# Patient Record
Sex: MALE | Race: WHITE | NOT HISPANIC OR LATINO | Employment: UNEMPLOYED | URBAN - METROPOLITAN AREA
[De-identification: names, ages, dates, MRNs, and addresses within clinical notes are randomized per-mention and may not be internally consistent; named-entity substitution may affect disease eponyms.]

---

## 2022-07-22 ENCOUNTER — HOSPITAL ENCOUNTER (INPATIENT)
Facility: HOSPITAL | Age: 47
LOS: 7 days | Discharge: LEFT BEFORE DISCHARGE | End: 2022-07-30
Attending: EMERGENCY MEDICINE | Admitting: INTERNAL MEDICINE

## 2022-07-22 ENCOUNTER — APPOINTMENT (OUTPATIENT)
Dept: CARDIOLOGY | Facility: HOSPITAL | Age: 47
End: 2022-07-22

## 2022-07-22 ENCOUNTER — APPOINTMENT (OUTPATIENT)
Dept: GENERAL RADIOLOGY | Facility: HOSPITAL | Age: 47
End: 2022-07-22

## 2022-07-22 DIAGNOSIS — R18.8 CIRRHOSIS OF LIVER WITH ASCITES, UNSPECIFIED HEPATIC CIRRHOSIS TYPE: ICD-10-CM

## 2022-07-22 DIAGNOSIS — R18.8 OTHER ASCITES: ICD-10-CM

## 2022-07-22 DIAGNOSIS — R06.00 DYSPNEA, UNSPECIFIED TYPE: ICD-10-CM

## 2022-07-22 DIAGNOSIS — R60.1 GENERALIZED EDEMA: Primary | ICD-10-CM

## 2022-07-22 DIAGNOSIS — K74.60 CIRRHOSIS OF LIVER WITH ASCITES, UNSPECIFIED HEPATIC CIRRHOSIS TYPE: ICD-10-CM

## 2022-07-22 PROBLEM — F19.10 SUBSTANCE ABUSE: Status: ACTIVE | Noted: 2022-07-22

## 2022-07-22 PROBLEM — R80.9 PROTEINURIA: Status: ACTIVE | Noted: 2022-07-22

## 2022-07-22 PROBLEM — Z72.0 TOBACCO ABUSE: Status: ACTIVE | Noted: 2022-07-22

## 2022-07-22 PROBLEM — E88.09 HYPOALBUMINEMIA: Status: ACTIVE | Noted: 2022-07-22

## 2022-07-22 LAB
ALBUMIN SERPL-MCNC: 1.4 G/DL (ref 3.5–5.2)
ALBUMIN/GLOB SERPL: 0.3 G/DL
ALP SERPL-CCNC: 155 U/L (ref 39–117)
ALT SERPL W P-5'-P-CCNC: 19 U/L (ref 1–41)
AMPHET+METHAMPHET UR QL: POSITIVE
ANION GAP SERPL CALCULATED.3IONS-SCNC: 7 MMOL/L (ref 5–15)
AORTIC DIMENSIONLESS INDEX: 0.7 (DI)
AST SERPL-CCNC: 35 U/L (ref 1–40)
BACTERIA UR QL AUTO: NORMAL /HPF
BARBITURATES UR QL SCN: NEGATIVE
BASOPHILS # BLD AUTO: 0.13 10*3/MM3 (ref 0–0.2)
BASOPHILS NFR BLD AUTO: 1 % (ref 0–1.5)
BENZODIAZ UR QL SCN: NEGATIVE
BH CV ECHO MEAS - ACS: 2.5 CM
BH CV ECHO MEAS - AO MAX PG: 4.8 MMHG
BH CV ECHO MEAS - AO MEAN PG: 2.32 MMHG
BH CV ECHO MEAS - AO ROOT DIAM: 4.2 CM
BH CV ECHO MEAS - AO V2 MAX: 109.8 CM/SEC
BH CV ECHO MEAS - AO V2 VTI: 16.6 CM
BH CV ECHO MEAS - AVA(I,D): 2.8 CM2
BH CV ECHO MEAS - CONTRAST EF 4CH: 44 CM2
BH CV ECHO MEAS - EDV(CUBED): 98.6 ML
BH CV ECHO MEAS - EDV(MOD-SP2): 143 ML
BH CV ECHO MEAS - EDV(MOD-SP4): 138 ML
BH CV ECHO MEAS - EF(MOD-BP): 44.4 %
BH CV ECHO MEAS - EF(MOD-SP2): 44.8 %
BH CV ECHO MEAS - EF(MOD-SP4): 44.9 %
BH CV ECHO MEAS - ESV(CUBED): 51.4 ML
BH CV ECHO MEAS - ESV(MOD-SP2): 79 ML
BH CV ECHO MEAS - ESV(MOD-SP4): 76 ML
BH CV ECHO MEAS - FS: 19.5 %
BH CV ECHO MEAS - IVS/LVPW: 1.57 CM
BH CV ECHO MEAS - IVSD: 1.19 CM
BH CV ECHO MEAS - LAT PEAK E' VEL: 10 CM/SEC
BH CV ECHO MEAS - LV MASS(C)D: 153.7 GRAMS
BH CV ECHO MEAS - LV MAX PG: 2.3 MMHG
BH CV ECHO MEAS - LV MEAN PG: 1.24 MMHG
BH CV ECHO MEAS - LV V1 MAX: 75.8 CM/SEC
BH CV ECHO MEAS - LV V1 VTI: 11.4 CM
BH CV ECHO MEAS - LVIDD: 4.6 CM
BH CV ECHO MEAS - LVIDS: 3.7 CM
BH CV ECHO MEAS - LVOT AREA: 4.1 CM2
BH CV ECHO MEAS - LVOT DIAM: 2.28 CM
BH CV ECHO MEAS - LVPWD: 0.75 CM
BH CV ECHO MEAS - MED PEAK E' VEL: 8.2 CM/SEC
BH CV ECHO MEAS - MV A DUR: 0.16 SEC
BH CV ECHO MEAS - MV A MAX VEL: 60.8 CM/SEC
BH CV ECHO MEAS - MV DEC SLOPE: 215.8 CM/SEC2
BH CV ECHO MEAS - MV DEC TIME: 0.2 MSEC
BH CV ECHO MEAS - MV E MAX VEL: 41.1 CM/SEC
BH CV ECHO MEAS - MV E/A: 0.68
BH CV ECHO MEAS - MV MAX PG: 2.28 MMHG
BH CV ECHO MEAS - MV MEAN PG: 0.94 MMHG
BH CV ECHO MEAS - MV P1/2T: 72.9 MSEC
BH CV ECHO MEAS - MV V2 VTI: 17.1 CM
BH CV ECHO MEAS - MVA(P1/2T): 3 CM2
BH CV ECHO MEAS - MVA(VTI): 2.7 CM2
BH CV ECHO MEAS - PULM A REVS DUR: 0.14 SEC
BH CV ECHO MEAS - PULM A REVS VEL: 24.3 CM/SEC
BH CV ECHO MEAS - PULM DIAS VEL: 41.3 CM/SEC
BH CV ECHO MEAS - PULM S/D: 1.32
BH CV ECHO MEAS - PULM SYS VEL: 54.6 CM/SEC
BH CV ECHO MEAS - RAP SYSTOLE: 3 MMHG
BH CV ECHO MEAS - RVSP: 29.7 MMHG
BH CV ECHO MEAS - SV(LVOT): 46.4 ML
BH CV ECHO MEAS - SV(MOD-SP2): 64 ML
BH CV ECHO MEAS - SV(MOD-SP4): 62 ML
BH CV ECHO MEAS - TAPSE (>1.6): 1.7 CM
BH CV ECHO MEAS - TR MAX PG: 26.7 MMHG
BH CV ECHO MEAS - TR MAX VEL: 258.4 CM/SEC
BH CV ECHO MEASUREMENTS AVERAGE E/E' RATIO: 4.52
BH CV VAS BP RIGHT ARM: NORMAL MMHG
BH CV XLRA - RV BASE: 3.1 CM
BH CV XLRA - RV LENGTH: 7.1 CM
BH CV XLRA - RV MID: 3 CM
BH CV XLRA - TDI S': 13.3 CM/SEC
BILIRUB SERPL-MCNC: <0.2 MG/DL (ref 0–1.2)
BILIRUB UR QL STRIP: NEGATIVE
BUN SERPL-MCNC: 37 MG/DL (ref 6–20)
BUN/CREAT SERPL: 33.3 (ref 7–25)
C3 SERPL-MCNC: 97 MG/DL (ref 82–167)
C4 SERPL-MCNC: 8 MG/DL (ref 14–44)
CALCIUM SPEC-SCNC: 7.5 MG/DL (ref 8.6–10.5)
CANNABINOIDS SERPL QL: POSITIVE
CHLORIDE SERPL-SCNC: 105 MMOL/L (ref 98–107)
CHLORIDE UR-SCNC: 51 MMOL/L
CLARITY UR: CLEAR
CO2 SERPL-SCNC: 19 MMOL/L (ref 22–29)
COCAINE UR QL: NEGATIVE
COLOR UR: YELLOW
CREAT SERPL-MCNC: 1.11 MG/DL (ref 0.76–1.27)
CREAT UR-MCNC: 82.5 MG/DL
DEPRECATED RDW RBC AUTO: 47.5 FL (ref 37–54)
EGFRCR SERPLBLD CKD-EPI 2021: 83.5 ML/MIN/1.73
EOSINOPHIL # BLD AUTO: 0.42 10*3/MM3 (ref 0–0.4)
EOSINOPHIL NFR BLD AUTO: 3.3 % (ref 0.3–6.2)
ERYTHROCYTE [DISTWIDTH] IN BLOOD BY AUTOMATED COUNT: 17.2 % (ref 12.3–15.4)
GLOBULIN UR ELPH-MCNC: 4.4 GM/DL
GLUCOSE SERPL-MCNC: 143 MG/DL (ref 65–99)
GLUCOSE UR STRIP-MCNC: NEGATIVE MG/DL
HBV CORE IGM SERPL QL IA: NORMAL
HBV SURFACE AG SERPL QL IA: NORMAL
HCT VFR BLD AUTO: 34.8 % (ref 37.5–51)
HCV AB SER DONR QL: REACTIVE
HGB BLD-MCNC: 12.4 G/DL (ref 13–17.7)
HGB UR QL STRIP.AUTO: ABNORMAL
HYALINE CASTS UR QL AUTO: NORMAL /LPF
IMM GRANULOCYTES # BLD AUTO: 0.18 10*3/MM3 (ref 0–0.05)
IMM GRANULOCYTES NFR BLD AUTO: 1.4 % (ref 0–0.5)
KETONES UR QL STRIP: NEGATIVE
LEFT ATRIUM VOLUME INDEX: 21.6 ML/M2
LEUKOCYTE ESTERASE UR QL STRIP.AUTO: NEGATIVE
LYMPHOCYTES # BLD AUTO: 2.27 10*3/MM3 (ref 0.7–3.1)
LYMPHOCYTES NFR BLD AUTO: 18.1 % (ref 19.6–45.3)
MAXIMAL PREDICTED HEART RATE: 175 BPM
MCH RBC QN AUTO: 28.3 PG (ref 26.6–33)
MCHC RBC AUTO-ENTMCNC: 35.6 G/DL (ref 31.5–35.7)
MCV RBC AUTO: 79.5 FL (ref 79–97)
METHADONE UR QL SCN: NEGATIVE
MONOCYTES # BLD AUTO: 1.13 10*3/MM3 (ref 0.1–0.9)
MONOCYTES NFR BLD AUTO: 9 % (ref 5–12)
NEUTROPHILS NFR BLD AUTO: 67.2 % (ref 42.7–76)
NEUTROPHILS NFR BLD AUTO: 8.44 10*3/MM3 (ref 1.7–7)
NITRITE UR QL STRIP: NEGATIVE
NRBC BLD AUTO-RTO: 0 /100 WBC (ref 0–0.2)
NT-PROBNP SERPL-MCNC: 231 PG/ML (ref 0–450)
OPIATES UR QL: NEGATIVE
OXYCODONE UR QL SCN: NEGATIVE
PH UR STRIP.AUTO: 5.5 [PH] (ref 5–8)
PLATELET # BLD AUTO: 198 10*3/MM3 (ref 140–450)
PMV BLD AUTO: 9.7 FL (ref 6–12)
POTASSIUM SERPL-SCNC: 4.4 MMOL/L (ref 3.5–5.2)
PROT ?TM UR-MCNC: 214.4 MG/DL
PROT SERPL-MCNC: 5.8 G/DL (ref 6–8.5)
PROT UR QL STRIP: ABNORMAL
PROT/CREAT UR: 2598.8 MG/G CREA (ref 0–200)
QT INTERVAL: 372 MS
RBC # BLD AUTO: 4.38 10*6/MM3 (ref 4.14–5.8)
RBC # UR STRIP: NORMAL /HPF
REF LAB TEST METHOD: NORMAL
SARS-COV-2 RNA RESP QL NAA+PROBE: NOT DETECTED
SINUS: 3.8 CM
SODIUM SERPL-SCNC: 131 MMOL/L (ref 136–145)
SODIUM UR-SCNC: <20 MMOL/L
SP GR UR STRIP: 1.02 (ref 1–1.03)
SQUAMOUS #/AREA URNS HPF: NORMAL /HPF
STJ: 3.6 CM
STRESS TARGET HR: 149 BPM
TROPONIN T SERPL-MCNC: <0.01 NG/ML (ref 0–0.03)
UROBILINOGEN UR QL STRIP: ABNORMAL
WBC # UR STRIP: NORMAL /HPF
WBC NRBC COR # BLD: 12.57 10*3/MM3 (ref 3.4–10.8)

## 2022-07-22 PROCEDURE — 82436 ASSAY OF URINE CHLORIDE: CPT | Performed by: INTERNAL MEDICINE

## 2022-07-22 PROCEDURE — 86658 ENTEROVIRUS ANTIBODY: CPT | Performed by: NURSE PRACTITIONER

## 2022-07-22 PROCEDURE — 25010000002 FUROSEMIDE PER 20 MG: Performed by: EMERGENCY MEDICINE

## 2022-07-22 PROCEDURE — 80307 DRUG TEST PRSMV CHEM ANLYZR: CPT | Performed by: NURSE PRACTITIONER

## 2022-07-22 PROCEDURE — 84156 ASSAY OF PROTEIN URINE: CPT | Performed by: INTERNAL MEDICINE

## 2022-07-22 PROCEDURE — 87341 HEP B SURFACE AG NEUTRLZJ IA: CPT | Performed by: INTERNAL MEDICINE

## 2022-07-22 PROCEDURE — G0378 HOSPITAL OBSERVATION PER HR: HCPCS

## 2022-07-22 PROCEDURE — 93010 ELECTROCARDIOGRAM REPORT: CPT | Performed by: INTERNAL MEDICINE

## 2022-07-22 PROCEDURE — 86037 ANCA TITER EACH ANTIBODY: CPT | Performed by: INTERNAL MEDICINE

## 2022-07-22 PROCEDURE — 83516 IMMUNOASSAY NONANTIBODY: CPT | Performed by: INTERNAL MEDICINE

## 2022-07-22 PROCEDURE — 81001 URINALYSIS AUTO W/SCOPE: CPT | Performed by: EMERGENCY MEDICINE

## 2022-07-22 PROCEDURE — 93005 ELECTROCARDIOGRAM TRACING: CPT | Performed by: EMERGENCY MEDICINE

## 2022-07-22 PROCEDURE — 83521 IG LIGHT CHAINS FREE EACH: CPT | Performed by: INTERNAL MEDICINE

## 2022-07-22 PROCEDURE — 25010000002 PERFLUTREN (DEFINITY) 8.476 MG IN SODIUM CHLORIDE (PF) 0.9 % 10 ML INJECTION: Performed by: NURSE PRACTITIONER

## 2022-07-22 PROCEDURE — 86334 IMMUNOFIX E-PHORESIS SERUM: CPT | Performed by: INTERNAL MEDICINE

## 2022-07-22 PROCEDURE — 93306 TTE W/DOPPLER COMPLETE: CPT

## 2022-07-22 PROCEDURE — 82570 ASSAY OF URINE CREATININE: CPT | Performed by: INTERNAL MEDICINE

## 2022-07-22 PROCEDURE — 80074 ACUTE HEPATITIS PANEL: CPT | Performed by: INTERNAL MEDICINE

## 2022-07-22 PROCEDURE — 93306 TTE W/DOPPLER COMPLETE: CPT | Performed by: INTERNAL MEDICINE

## 2022-07-22 PROCEDURE — 93005 ELECTROCARDIOGRAM TRACING: CPT

## 2022-07-22 PROCEDURE — 86160 COMPLEMENT ANTIGEN: CPT | Performed by: INTERNAL MEDICINE

## 2022-07-22 PROCEDURE — 84300 ASSAY OF URINE SODIUM: CPT | Performed by: INTERNAL MEDICINE

## 2022-07-22 PROCEDURE — 85025 COMPLETE CBC W/AUTO DIFF WBC: CPT | Performed by: EMERGENCY MEDICINE

## 2022-07-22 PROCEDURE — 25010000002 ONDANSETRON PER 1 MG: Performed by: INTERNAL MEDICINE

## 2022-07-22 PROCEDURE — 84484 ASSAY OF TROPONIN QUANT: CPT | Performed by: EMERGENCY MEDICINE

## 2022-07-22 PROCEDURE — 90791 PSYCH DIAGNOSTIC EVALUATION: CPT

## 2022-07-22 PROCEDURE — 83880 ASSAY OF NATRIURETIC PEPTIDE: CPT | Performed by: EMERGENCY MEDICINE

## 2022-07-22 PROCEDURE — 80053 COMPREHEN METABOLIC PANEL: CPT | Performed by: EMERGENCY MEDICINE

## 2022-07-22 PROCEDURE — 71045 X-RAY EXAM CHEST 1 VIEW: CPT

## 2022-07-22 PROCEDURE — 82784 ASSAY IGA/IGD/IGG/IGM EACH: CPT | Performed by: INTERNAL MEDICINE

## 2022-07-22 PROCEDURE — U0003 INFECTIOUS AGENT DETECTION BY NUCLEIC ACID (DNA OR RNA); SEVERE ACUTE RESPIRATORY SYNDROME CORONAVIRUS 2 (SARS-COV-2) (CORONAVIRUS DISEASE [COVID-19]), AMPLIFIED PROBE TECHNIQUE, MAKING USE OF HIGH THROUGHPUT TECHNOLOGIES AS DESCRIBED BY CMS-2020-01-R: HCPCS | Performed by: EMERGENCY MEDICINE

## 2022-07-22 PROCEDURE — 82595 ASSAY OF CRYOGLOBULIN: CPT | Performed by: INTERNAL MEDICINE

## 2022-07-22 PROCEDURE — 99284 EMERGENCY DEPT VISIT MOD MDM: CPT

## 2022-07-22 RX ORDER — OXYCODONE HYDROCHLORIDE AND ACETAMINOPHEN 5; 325 MG/1; MG/1
1 TABLET ORAL ONCE
Status: COMPLETED | OUTPATIENT
Start: 2022-07-22 | End: 2022-07-22

## 2022-07-22 RX ORDER — SODIUM CHLORIDE 0.9 % (FLUSH) 0.9 %
10 SYRINGE (ML) INJECTION EVERY 12 HOURS SCHEDULED
Status: DISCONTINUED | OUTPATIENT
Start: 2022-07-22 | End: 2022-07-30 | Stop reason: HOSPADM

## 2022-07-22 RX ORDER — NICOTINE 21 MG/24HR
1 PATCH, TRANSDERMAL 24 HOURS TRANSDERMAL
Status: DISCONTINUED | OUTPATIENT
Start: 2022-07-22 | End: 2022-07-30 | Stop reason: HOSPADM

## 2022-07-22 RX ORDER — HYDROXYZINE HYDROCHLORIDE 25 MG/1
50 TABLET, FILM COATED ORAL 3 TIMES DAILY PRN
Status: DISPENSED | OUTPATIENT
Start: 2022-07-22 | End: 2022-07-27

## 2022-07-22 RX ORDER — CLONIDINE HYDROCHLORIDE 0.1 MG/1
0.1 TABLET ORAL ONCE AS NEEDED
Status: DISCONTINUED | OUTPATIENT
Start: 2022-07-26 | End: 2022-07-27

## 2022-07-22 RX ORDER — CLONIDINE HYDROCHLORIDE 0.1 MG/1
0.1 TABLET ORAL 2 TIMES DAILY PRN
Status: DISCONTINUED | OUTPATIENT
Start: 2022-07-25 | End: 2022-07-26

## 2022-07-22 RX ORDER — CLONIDINE HYDROCHLORIDE 0.1 MG/1
0.1 TABLET ORAL 4 TIMES DAILY PRN
Status: DISCONTINUED | OUTPATIENT
Start: 2022-07-23 | End: 2022-07-24

## 2022-07-22 RX ORDER — SODIUM CHLORIDE 0.9 % (FLUSH) 0.9 %
10 SYRINGE (ML) INJECTION AS NEEDED
Status: DISCONTINUED | OUTPATIENT
Start: 2022-07-22 | End: 2022-07-30 | Stop reason: HOSPADM

## 2022-07-22 RX ORDER — ACETAMINOPHEN 160 MG/5ML
650 SOLUTION ORAL EVERY 4 HOURS PRN
Status: DISCONTINUED | OUTPATIENT
Start: 2022-07-22 | End: 2022-07-30 | Stop reason: HOSPADM

## 2022-07-22 RX ORDER — NITROGLYCERIN 0.4 MG/1
0.4 TABLET SUBLINGUAL
Status: DISCONTINUED | OUTPATIENT
Start: 2022-07-22 | End: 2022-07-30 | Stop reason: HOSPADM

## 2022-07-22 RX ORDER — FUROSEMIDE 10 MG/ML
80 INJECTION INTRAMUSCULAR; INTRAVENOUS ONCE
Status: COMPLETED | OUTPATIENT
Start: 2022-07-22 | End: 2022-07-22

## 2022-07-22 RX ORDER — ACETAMINOPHEN 325 MG/1
650 TABLET ORAL EVERY 4 HOURS PRN
Status: DISCONTINUED | OUTPATIENT
Start: 2022-07-22 | End: 2022-07-30 | Stop reason: HOSPADM

## 2022-07-22 RX ORDER — ACETAMINOPHEN 650 MG/1
650 SUPPOSITORY RECTAL EVERY 4 HOURS PRN
Status: DISCONTINUED | OUTPATIENT
Start: 2022-07-22 | End: 2022-07-30 | Stop reason: HOSPADM

## 2022-07-22 RX ORDER — ONDANSETRON 2 MG/ML
4 INJECTION INTRAMUSCULAR; INTRAVENOUS EVERY 6 HOURS PRN
Status: DISCONTINUED | OUTPATIENT
Start: 2022-07-22 | End: 2022-07-22

## 2022-07-22 RX ORDER — CLONIDINE HYDROCHLORIDE 0.1 MG/1
0.1 TABLET ORAL 3 TIMES DAILY PRN
Status: DISCONTINUED | OUTPATIENT
Start: 2022-07-24 | End: 2022-07-25

## 2022-07-22 RX ORDER — CLONIDINE HYDROCHLORIDE 0.1 MG/1
0.1 TABLET ORAL 4 TIMES DAILY PRN
Status: DISCONTINUED | OUTPATIENT
Start: 2022-07-22 | End: 2022-07-23

## 2022-07-22 RX ORDER — ONDANSETRON 2 MG/ML
4 INJECTION INTRAMUSCULAR; INTRAVENOUS EVERY 6 HOURS PRN
Status: DISCONTINUED | OUTPATIENT
Start: 2022-07-22 | End: 2022-07-30 | Stop reason: HOSPADM

## 2022-07-22 RX ORDER — ALUMINA, MAGNESIA, AND SIMETHICONE 2400; 2400; 240 MG/30ML; MG/30ML; MG/30ML
15 SUSPENSION ORAL EVERY 6 HOURS PRN
Status: DISCONTINUED | OUTPATIENT
Start: 2022-07-22 | End: 2022-07-30 | Stop reason: HOSPADM

## 2022-07-22 RX ADMIN — HYDROXYZINE HYDROCHLORIDE 50 MG: 25 TABLET ORAL at 23:40

## 2022-07-22 RX ADMIN — ONDANSETRON 4 MG: 2 INJECTION INTRAMUSCULAR; INTRAVENOUS at 08:35

## 2022-07-22 RX ADMIN — OXYCODONE AND ACETAMINOPHEN 1 TABLET: 5; 325 TABLET ORAL at 07:02

## 2022-07-22 RX ADMIN — PERFLUTREN 2 ML: 6.52 INJECTION, SUSPENSION INTRAVENOUS at 16:00

## 2022-07-22 RX ADMIN — ALUMINA, MAGNESIA, AND SIMETHICONE 15 ML: 2400; 2400; 240 SUSPENSION ORAL at 14:10

## 2022-07-22 RX ADMIN — NICOTINE 1 PATCH: 21 PATCH, EXTENDED RELEASE TRANSDERMAL at 14:14

## 2022-07-22 RX ADMIN — ACETAMINOPHEN 650 MG: 325 TABLET, FILM COATED ORAL at 18:30

## 2022-07-22 RX ADMIN — ACETAMINOPHEN 650 MG: 325 TABLET, FILM COATED ORAL at 23:40

## 2022-07-22 RX ADMIN — ALUMINA, MAGNESIA, AND SIMETHICONE 15 ML: 2400; 2400; 240 SUSPENSION ORAL at 08:35

## 2022-07-22 RX ADMIN — HYDROXYZINE HYDROCHLORIDE 50 MG: 25 TABLET ORAL at 14:38

## 2022-07-22 RX ADMIN — FUROSEMIDE 80 MG: 10 INJECTION, SOLUTION INTRAMUSCULAR; INTRAVENOUS at 07:02

## 2022-07-23 ENCOUNTER — APPOINTMENT (OUTPATIENT)
Dept: ULTRASOUND IMAGING | Facility: HOSPITAL | Age: 47
End: 2022-07-23

## 2022-07-23 ENCOUNTER — APPOINTMENT (OUTPATIENT)
Dept: CARDIOLOGY | Facility: HOSPITAL | Age: 47
End: 2022-07-23

## 2022-07-23 PROBLEM — R60.1 GENERALIZED EDEMA: Status: ACTIVE | Noted: 2022-07-23

## 2022-07-23 LAB
ALBUMIN SERPL-MCNC: 1.7 G/DL (ref 3.5–5.2)
ALBUMIN/GLOB SERPL: 0.5 G/DL
ALP SERPL-CCNC: 137 U/L (ref 39–117)
ALT SERPL W P-5'-P-CCNC: 16 U/L (ref 1–41)
ANION GAP SERPL CALCULATED.3IONS-SCNC: 7 MMOL/L (ref 5–15)
AST SERPL-CCNC: 33 U/L (ref 1–40)
BASOPHILS # BLD AUTO: 0.12 10*3/MM3 (ref 0–0.2)
BASOPHILS NFR BLD AUTO: 1.3 % (ref 0–1.5)
BH CV LOWER VASCULAR LEFT COMMON FEMORAL AUGMENT: NORMAL
BH CV LOWER VASCULAR LEFT COMMON FEMORAL COMPETENT: NORMAL
BH CV LOWER VASCULAR LEFT COMMON FEMORAL COMPRESS: NORMAL
BH CV LOWER VASCULAR LEFT COMMON FEMORAL PHASIC: NORMAL
BH CV LOWER VASCULAR LEFT COMMON FEMORAL SPONT: NORMAL
BH CV LOWER VASCULAR LEFT DISTAL FEMORAL COMPRESS: NORMAL
BH CV LOWER VASCULAR LEFT GASTRONEMIUS COMPRESS: NORMAL
BH CV LOWER VASCULAR LEFT GREATER SAPH AK COMPRESS: NORMAL
BH CV LOWER VASCULAR LEFT GREATER SAPH BK COMPRESS: NORMAL
BH CV LOWER VASCULAR LEFT LESSER SAPH COMPRESS: NORMAL
BH CV LOWER VASCULAR LEFT MID FEMORAL AUGMENT: NORMAL
BH CV LOWER VASCULAR LEFT MID FEMORAL COMPETENT: NORMAL
BH CV LOWER VASCULAR LEFT MID FEMORAL COMPRESS: NORMAL
BH CV LOWER VASCULAR LEFT MID FEMORAL PHASIC: NORMAL
BH CV LOWER VASCULAR LEFT MID FEMORAL SPONT: NORMAL
BH CV LOWER VASCULAR LEFT PERONEAL COMPRESS: NORMAL
BH CV LOWER VASCULAR LEFT POPLITEAL AUGMENT: NORMAL
BH CV LOWER VASCULAR LEFT POPLITEAL COMPETENT: NORMAL
BH CV LOWER VASCULAR LEFT POPLITEAL COMPRESS: NORMAL
BH CV LOWER VASCULAR LEFT POPLITEAL PHASIC: NORMAL
BH CV LOWER VASCULAR LEFT POPLITEAL SPONT: NORMAL
BH CV LOWER VASCULAR LEFT POSTERIOR TIBIAL COMPRESS: NORMAL
BH CV LOWER VASCULAR LEFT PROFUNDA FEMORAL COMPRESS: NORMAL
BH CV LOWER VASCULAR LEFT PROXIMAL FEMORAL COMPRESS: NORMAL
BH CV LOWER VASCULAR LEFT SAPHENOFEMORAL JUNCTION COMPRESS: NORMAL
BH CV LOWER VASCULAR RIGHT COMMON FEMORAL AUGMENT: NORMAL
BH CV LOWER VASCULAR RIGHT COMMON FEMORAL COMPETENT: NORMAL
BH CV LOWER VASCULAR RIGHT COMMON FEMORAL COMPRESS: NORMAL
BH CV LOWER VASCULAR RIGHT COMMON FEMORAL PHASIC: NORMAL
BH CV LOWER VASCULAR RIGHT COMMON FEMORAL SPONT: NORMAL
BH CV LOWER VASCULAR RIGHT DISTAL FEMORAL COMPRESS: NORMAL
BH CV LOWER VASCULAR RIGHT GASTRONEMIUS COMPRESS: NORMAL
BH CV LOWER VASCULAR RIGHT GREATER SAPH AK COMPRESS: NORMAL
BH CV LOWER VASCULAR RIGHT GREATER SAPH BK COMPRESS: NORMAL
BH CV LOWER VASCULAR RIGHT LESSER SAPH COMPRESS: NORMAL
BH CV LOWER VASCULAR RIGHT MID FEMORAL AUGMENT: NORMAL
BH CV LOWER VASCULAR RIGHT MID FEMORAL COMPETENT: NORMAL
BH CV LOWER VASCULAR RIGHT MID FEMORAL COMPRESS: NORMAL
BH CV LOWER VASCULAR RIGHT MID FEMORAL PHASIC: NORMAL
BH CV LOWER VASCULAR RIGHT MID FEMORAL SPONT: NORMAL
BH CV LOWER VASCULAR RIGHT PERONEAL COMPRESS: NORMAL
BH CV LOWER VASCULAR RIGHT POPLITEAL AUGMENT: NORMAL
BH CV LOWER VASCULAR RIGHT POPLITEAL COMPETENT: NORMAL
BH CV LOWER VASCULAR RIGHT POPLITEAL COMPRESS: NORMAL
BH CV LOWER VASCULAR RIGHT POPLITEAL PHASIC: NORMAL
BH CV LOWER VASCULAR RIGHT POPLITEAL SPONT: NORMAL
BH CV LOWER VASCULAR RIGHT POSTERIOR TIBIAL COMPRESS: NORMAL
BH CV LOWER VASCULAR RIGHT PROFUNDA FEMORAL COMPRESS: NORMAL
BH CV LOWER VASCULAR RIGHT PROXIMAL FEMORAL COMPRESS: NORMAL
BH CV LOWER VASCULAR RIGHT SAPHENOFEMORAL JUNCTION COMPRESS: NORMAL
BILIRUB SERPL-MCNC: <0.2 MG/DL (ref 0–1.2)
BUN SERPL-MCNC: 35 MG/DL (ref 6–20)
BUN/CREAT SERPL: 31.5 (ref 7–25)
CALCIUM SPEC-SCNC: 7.6 MG/DL (ref 8.6–10.5)
CHLORIDE SERPL-SCNC: 103 MMOL/L (ref 98–107)
CO2 SERPL-SCNC: 20 MMOL/L (ref 22–29)
CREAT SERPL-MCNC: 1.11 MG/DL (ref 0.76–1.27)
CREAT UR-MCNC: 41.8 MG/DL
DEPRECATED RDW RBC AUTO: 51.4 FL (ref 37–54)
EGFRCR SERPLBLD CKD-EPI 2021: 83.5 ML/MIN/1.73
EOSINOPHIL # BLD AUTO: 0.48 10*3/MM3 (ref 0–0.4)
EOSINOPHIL NFR BLD AUTO: 5 % (ref 0.3–6.2)
ERYTHROCYTE [DISTWIDTH] IN BLOOD BY AUTOMATED COUNT: 17.3 % (ref 12.3–15.4)
GLOBULIN UR ELPH-MCNC: 3.6 GM/DL
GLUCOSE SERPL-MCNC: 146 MG/DL (ref 65–99)
HBV SURFACE AG SERPL QL IA: NORMAL
HBV SURFACE AG SERPL QL NT: POSITIVE
HCT VFR BLD AUTO: 34.8 % (ref 37.5–51)
HGB BLD-MCNC: 11.6 G/DL (ref 13–17.7)
HIV1+2 AB SER QL: NORMAL
IMM GRANULOCYTES # BLD AUTO: 0.1 10*3/MM3 (ref 0–0.05)
IMM GRANULOCYTES NFR BLD AUTO: 1 % (ref 0–0.5)
LYMPHOCYTES # BLD AUTO: 1.89 10*3/MM3 (ref 0.7–3.1)
LYMPHOCYTES NFR BLD AUTO: 19.7 % (ref 19.6–45.3)
MAGNESIUM SERPL-MCNC: 1.8 MG/DL (ref 1.6–2.6)
MAXIMAL PREDICTED HEART RATE: 175 BPM
MCH RBC QN AUTO: 27.6 PG (ref 26.6–33)
MCHC RBC AUTO-ENTMCNC: 33.3 G/DL (ref 31.5–35.7)
MCV RBC AUTO: 82.7 FL (ref 79–97)
MONOCYTES # BLD AUTO: 0.84 10*3/MM3 (ref 0.1–0.9)
MONOCYTES NFR BLD AUTO: 8.8 % (ref 5–12)
NEUTROPHILS NFR BLD AUTO: 6.16 10*3/MM3 (ref 1.7–7)
NEUTROPHILS NFR BLD AUTO: 64.2 % (ref 42.7–76)
NRBC BLD AUTO-RTO: 0 /100 WBC (ref 0–0.2)
PHOSPHATE SERPL-MCNC: 3.9 MG/DL (ref 2.5–4.5)
PLATELET # BLD AUTO: 181 10*3/MM3 (ref 140–450)
PMV BLD AUTO: 9.8 FL (ref 6–12)
POTASSIUM SERPL-SCNC: 3.9 MMOL/L (ref 3.5–5.2)
PROT ?TM UR-MCNC: 150.3 MG/DL
PROT SERPL-MCNC: 5.3 G/DL (ref 6–8.5)
PROT/CREAT UR: 3595.7 MG/G CREA (ref 0–200)
RBC # BLD AUTO: 4.21 10*6/MM3 (ref 4.14–5.8)
SODIUM SERPL-SCNC: 130 MMOL/L (ref 136–145)
STRESS TARGET HR: 149 BPM
URATE SERPL-MCNC: 6.9 MG/DL (ref 3.4–7)
WBC NRBC COR # BLD: 9.59 10*3/MM3 (ref 3.4–10.8)

## 2022-07-23 PROCEDURE — 25010000002 ALBUMIN HUMAN 25% PER 50 ML: Performed by: INTERNAL MEDICINE

## 2022-07-23 PROCEDURE — P9047 ALBUMIN (HUMAN), 25%, 50ML: HCPCS | Performed by: INTERNAL MEDICINE

## 2022-07-23 PROCEDURE — 93970 EXTREMITY STUDY: CPT

## 2022-07-23 PROCEDURE — G0432 EIA HIV-1/HIV-2 SCREEN: HCPCS | Performed by: INTERNAL MEDICINE

## 2022-07-23 PROCEDURE — 76700 US EXAM ABDOM COMPLETE: CPT

## 2022-07-23 PROCEDURE — 84100 ASSAY OF PHOSPHORUS: CPT | Performed by: INTERNAL MEDICINE

## 2022-07-23 PROCEDURE — 85025 COMPLETE CBC W/AUTO DIFF WBC: CPT | Performed by: INTERNAL MEDICINE

## 2022-07-23 PROCEDURE — 83735 ASSAY OF MAGNESIUM: CPT | Performed by: INTERNAL MEDICINE

## 2022-07-23 PROCEDURE — 25010000002 FUROSEMIDE PER 20 MG: Performed by: INTERNAL MEDICINE

## 2022-07-23 PROCEDURE — 84550 ASSAY OF BLOOD/URIC ACID: CPT | Performed by: INTERNAL MEDICINE

## 2022-07-23 PROCEDURE — 36415 COLL VENOUS BLD VENIPUNCTURE: CPT | Performed by: NURSE PRACTITIONER

## 2022-07-23 PROCEDURE — 80053 COMPREHEN METABOLIC PANEL: CPT | Performed by: NURSE PRACTITIONER

## 2022-07-23 PROCEDURE — 87517 HEPATITIS B DNA QUANT: CPT | Performed by: INTERNAL MEDICINE

## 2022-07-23 PROCEDURE — 82570 ASSAY OF URINE CREATININE: CPT | Performed by: INTERNAL MEDICINE

## 2022-07-23 PROCEDURE — 84156 ASSAY OF PROTEIN URINE: CPT | Performed by: INTERNAL MEDICINE

## 2022-07-23 RX ORDER — FUROSEMIDE 10 MG/ML
80 INJECTION INTRAMUSCULAR; INTRAVENOUS EVERY 12 HOURS
Status: DISCONTINUED | OUTPATIENT
Start: 2022-07-23 | End: 2022-07-26

## 2022-07-23 RX ORDER — OXYCODONE HYDROCHLORIDE 5 MG/1
5 TABLET ORAL EVERY 6 HOURS PRN
Status: DISCONTINUED | OUTPATIENT
Start: 2022-07-23 | End: 2022-07-26

## 2022-07-23 RX ORDER — ALBUMIN (HUMAN) 12.5 G/50ML
12.5 SOLUTION INTRAVENOUS EVERY 12 HOURS
Status: COMPLETED | OUTPATIENT
Start: 2022-07-23 | End: 2022-07-25

## 2022-07-23 RX ADMIN — OXYCODONE 5 MG: 5 TABLET ORAL at 22:55

## 2022-07-23 RX ADMIN — NICOTINE 1 PATCH: 21 PATCH, EXTENDED RELEASE TRANSDERMAL at 10:32

## 2022-07-23 RX ADMIN — OXYCODONE 5 MG: 5 TABLET ORAL at 17:01

## 2022-07-23 RX ADMIN — ALBUMIN HUMAN 12.5 G: 0.25 SOLUTION INTRAVENOUS at 17:02

## 2022-07-23 RX ADMIN — Medication 10 ML: at 22:55

## 2022-07-23 RX ADMIN — ACETAMINOPHEN 650 MG: 325 TABLET, FILM COATED ORAL at 09:01

## 2022-07-23 RX ADMIN — FUROSEMIDE 80 MG: 10 INJECTION, SOLUTION INTRAMUSCULAR; INTRAVENOUS at 12:24

## 2022-07-24 ENCOUNTER — HOSPITAL ENCOUNTER (OUTPATIENT)
Facility: HOSPITAL | Age: 47
Setting detail: HOSPITAL OUTPATIENT SURGERY
End: 2022-07-24
Attending: INTERNAL MEDICINE | Admitting: INTERNAL MEDICINE

## 2022-07-24 PROBLEM — R18.8 CIRRHOSIS OF LIVER WITH ASCITES: Status: ACTIVE | Noted: 2022-07-24

## 2022-07-24 PROBLEM — K74.60 CIRRHOSIS OF LIVER WITH ASCITES: Status: ACTIVE | Noted: 2022-07-24

## 2022-07-24 LAB
ALBUMIN SERPL-MCNC: 1.7 G/DL (ref 3.5–5.2)
ANION GAP SERPL CALCULATED.3IONS-SCNC: 7.4 MMOL/L (ref 5–15)
BUN SERPL-MCNC: 29 MG/DL (ref 6–20)
BUN/CREAT SERPL: 27.9 (ref 7–25)
CALCIUM SPEC-SCNC: 7.7 MG/DL (ref 8.6–10.5)
CHLORIDE SERPL-SCNC: 100 MMOL/L (ref 98–107)
CO2 SERPL-SCNC: 21.6 MMOL/L (ref 22–29)
CREAT SERPL-MCNC: 1.04 MG/DL (ref 0.76–1.27)
EGFRCR SERPLBLD CKD-EPI 2021: 90.2 ML/MIN/1.73
GLUCOSE SERPL-MCNC: 129 MG/DL (ref 65–99)
PHOSPHATE SERPL-MCNC: 3.4 MG/DL (ref 2.5–4.5)
POTASSIUM SERPL-SCNC: 4.2 MMOL/L (ref 3.5–5.2)
SODIUM SERPL-SCNC: 129 MMOL/L (ref 136–145)

## 2022-07-24 PROCEDURE — 25010000002 ALBUMIN HUMAN 25% PER 50 ML: Performed by: INTERNAL MEDICINE

## 2022-07-24 PROCEDURE — 99253 IP/OBS CNSLTJ NEW/EST LOW 45: CPT | Performed by: INTERNAL MEDICINE

## 2022-07-24 PROCEDURE — 25010000002 FUROSEMIDE PER 20 MG: Performed by: INTERNAL MEDICINE

## 2022-07-24 PROCEDURE — 99254 IP/OBS CNSLTJ NEW/EST MOD 60: CPT | Performed by: INTERNAL MEDICINE

## 2022-07-24 PROCEDURE — 80069 RENAL FUNCTION PANEL: CPT | Performed by: INTERNAL MEDICINE

## 2022-07-24 PROCEDURE — P9047 ALBUMIN (HUMAN), 25%, 50ML: HCPCS | Performed by: INTERNAL MEDICINE

## 2022-07-24 RX ADMIN — HYDROXYZINE HYDROCHLORIDE 50 MG: 25 TABLET ORAL at 04:44

## 2022-07-24 RX ADMIN — FUROSEMIDE 80 MG: 10 INJECTION, SOLUTION INTRAMUSCULAR; INTRAVENOUS at 04:59

## 2022-07-24 RX ADMIN — ALBUMIN HUMAN 12.5 G: 0.25 SOLUTION INTRAVENOUS at 16:22

## 2022-07-24 RX ADMIN — OXYCODONE 5 MG: 5 TABLET ORAL at 17:00

## 2022-07-24 RX ADMIN — OXYCODONE 5 MG: 5 TABLET ORAL at 04:44

## 2022-07-24 RX ADMIN — OXYCODONE 5 MG: 5 TABLET ORAL at 10:48

## 2022-07-24 RX ADMIN — ALBUMIN HUMAN 12.5 G: 0.25 SOLUTION INTRAVENOUS at 04:40

## 2022-07-24 RX ADMIN — NICOTINE 1 PATCH: 21 PATCH, EXTENDED RELEASE TRANSDERMAL at 08:25

## 2022-07-24 RX ADMIN — FUROSEMIDE 80 MG: 10 INJECTION, SOLUTION INTRAMUSCULAR; INTRAVENOUS at 16:42

## 2022-07-24 RX ADMIN — HYDROXYZINE HYDROCHLORIDE 50 MG: 25 TABLET ORAL at 17:00

## 2022-07-24 RX ADMIN — Medication 10 ML: at 08:26

## 2022-07-24 RX ADMIN — Medication 10 ML: at 20:36

## 2022-07-25 ENCOUNTER — ANESTHESIA EVENT (OUTPATIENT)
Dept: GASTROENTEROLOGY | Facility: HOSPITAL | Age: 47
End: 2022-07-25

## 2022-07-25 ENCOUNTER — ANESTHESIA (OUTPATIENT)
Dept: GASTROENTEROLOGY | Facility: HOSPITAL | Age: 47
End: 2022-07-25

## 2022-07-25 PROBLEM — I50.21 ACUTE SYSTOLIC HEART FAILURE: Status: ACTIVE | Noted: 2022-07-25

## 2022-07-25 PROBLEM — I51.9 LV DYSFUNCTION: Status: ACTIVE | Noted: 2022-07-25

## 2022-07-25 LAB
ALBUMIN SERPL-MCNC: 2.1 G/DL (ref 3.5–5.2)
ALBUMIN/GLOB SERPL: 0.6 G/DL
ALP SERPL-CCNC: 126 U/L (ref 39–117)
ALPHA-FETOPROTEIN: <2 NG/ML (ref 0–8.3)
ALT SERPL W P-5'-P-CCNC: 14 U/L (ref 1–41)
ANION GAP SERPL CALCULATED.3IONS-SCNC: 6.3 MMOL/L (ref 5–15)
AST SERPL-CCNC: 33 U/L (ref 1–40)
BASOPHILS # BLD AUTO: 0.06 10*3/MM3 (ref 0–0.2)
BASOPHILS NFR BLD AUTO: 0.9 % (ref 0–1.5)
BILIRUB SERPL-MCNC: 0.2 MG/DL (ref 0–1.2)
BUN SERPL-MCNC: 25 MG/DL (ref 6–20)
BUN/CREAT SERPL: 25.5 (ref 7–25)
CALCIUM SPEC-SCNC: 7.9 MG/DL (ref 8.6–10.5)
CHLORIDE SERPL-SCNC: 101 MMOL/L (ref 98–107)
CO2 SERPL-SCNC: 22.7 MMOL/L (ref 22–29)
CREAT SERPL-MCNC: 0.98 MG/DL (ref 0.76–1.27)
DEPRECATED RDW RBC AUTO: 50.5 FL (ref 37–54)
EGFRCR SERPLBLD CKD-EPI 2021: 96.9 ML/MIN/1.73
EOSINOPHIL # BLD AUTO: 0.28 10*3/MM3 (ref 0–0.4)
EOSINOPHIL NFR BLD AUTO: 4.2 % (ref 0.3–6.2)
ERYTHROCYTE [DISTWIDTH] IN BLOOD BY AUTOMATED COUNT: 17.4 % (ref 12.3–15.4)
GLOBULIN UR ELPH-MCNC: 3.4 GM/DL
GLUCOSE SERPL-MCNC: 115 MG/DL (ref 65–99)
HCT VFR BLD AUTO: 30.8 % (ref 37.5–51)
HGB BLD-MCNC: 10.5 G/DL (ref 13–17.7)
IGA SERPL-MCNC: 401 MG/DL (ref 90–386)
IGG SERPL-MCNC: 1575 MG/DL (ref 603–1613)
IGM SERPL-MCNC: 387 MG/DL (ref 20–172)
IMM GRANULOCYTES # BLD AUTO: 0.06 10*3/MM3 (ref 0–0.05)
IMM GRANULOCYTES NFR BLD AUTO: 0.9 % (ref 0–0.5)
INR PPP: 1.13 (ref 0.9–1.1)
KAPPA LC FREE SER-MCNC: 169.1 MG/L (ref 3.3–19.4)
KAPPA LC FREE/LAMBDA FREE SER: 1.09 {RATIO} (ref 0.26–1.65)
LAMBDA LC FREE SERPL-MCNC: 155.4 MG/L (ref 5.7–26.3)
LYMPHOCYTES # BLD AUTO: 1.09 10*3/MM3 (ref 0.7–3.1)
LYMPHOCYTES NFR BLD AUTO: 16.3 % (ref 19.6–45.3)
MCH RBC QN AUTO: 28 PG (ref 26.6–33)
MCHC RBC AUTO-ENTMCNC: 34.1 G/DL (ref 31.5–35.7)
MCV RBC AUTO: 82.1 FL (ref 79–97)
MONOCYTES # BLD AUTO: 0.69 10*3/MM3 (ref 0.1–0.9)
MONOCYTES NFR BLD AUTO: 10.3 % (ref 5–12)
NEUTROPHILS NFR BLD AUTO: 4.51 10*3/MM3 (ref 1.7–7)
NEUTROPHILS NFR BLD AUTO: 67.4 % (ref 42.7–76)
NRBC BLD AUTO-RTO: 0 /100 WBC (ref 0–0.2)
PHOSPHATE SERPL-MCNC: 3.1 MG/DL (ref 2.5–4.5)
PLA2R IGG SER IA-ACNC: 1.9 RU/ML (ref 0–19.9)
PLATELET # BLD AUTO: 124 10*3/MM3 (ref 140–450)
PMV BLD AUTO: 9.6 FL (ref 6–12)
POTASSIUM SERPL-SCNC: 4 MMOL/L (ref 3.5–5.2)
PROT PATTERN SERPL IFE-IMP: ABNORMAL
PROT SERPL-MCNC: 5.5 G/DL (ref 6–8.5)
PROTHROMBIN TIME: 14.3 SECONDS (ref 11.7–14.2)
RBC # BLD AUTO: 3.75 10*6/MM3 (ref 4.14–5.8)
SODIUM SERPL-SCNC: 130 MMOL/L (ref 136–145)
WBC NRBC COR # BLD: 6.69 10*3/MM3 (ref 3.4–10.8)

## 2022-07-25 PROCEDURE — 0F9G8ZZ DRAINAGE OF PANCREAS, VIA NATURAL OR ARTIFICIAL OPENING ENDOSCOPIC: ICD-10-PCS | Performed by: INTERNAL MEDICINE

## 2022-07-25 PROCEDURE — 82105 ALPHA-FETOPROTEIN SERUM: CPT | Performed by: INTERNAL MEDICINE

## 2022-07-25 PROCEDURE — 25010000002 ALBUMIN HUMAN 25% PER 50 ML: Performed by: INTERNAL MEDICINE

## 2022-07-25 PROCEDURE — 25010000002 PROPOFOL 10 MG/ML EMULSION: Performed by: NURSE ANESTHETIST, CERTIFIED REGISTERED

## 2022-07-25 PROCEDURE — P9047 ALBUMIN (HUMAN), 25%, 50ML: HCPCS | Performed by: INTERNAL MEDICINE

## 2022-07-25 PROCEDURE — 43235 EGD DIAGNOSTIC BRUSH WASH: CPT | Performed by: INTERNAL MEDICINE

## 2022-07-25 PROCEDURE — 80053 COMPREHEN METABOLIC PANEL: CPT | Performed by: INTERNAL MEDICINE

## 2022-07-25 PROCEDURE — 85610 PROTHROMBIN TIME: CPT | Performed by: INTERNAL MEDICINE

## 2022-07-25 PROCEDURE — 25010000002 FUROSEMIDE PER 20 MG: Performed by: INTERNAL MEDICINE

## 2022-07-25 PROCEDURE — 84100 ASSAY OF PHOSPHORUS: CPT | Performed by: INTERNAL MEDICINE

## 2022-07-25 PROCEDURE — 99232 SBSQ HOSP IP/OBS MODERATE 35: CPT | Performed by: NURSE PRACTITIONER

## 2022-07-25 PROCEDURE — 85025 COMPLETE CBC W/AUTO DIFF WBC: CPT | Performed by: INTERNAL MEDICINE

## 2022-07-25 PROCEDURE — S0260 H&P FOR SURGERY: HCPCS | Performed by: INTERNAL MEDICINE

## 2022-07-25 PROCEDURE — 87522 HEPATITIS C REVRS TRNSCRPJ: CPT | Performed by: INTERNAL MEDICINE

## 2022-07-25 PROCEDURE — 25010000002 ONDANSETRON PER 1 MG: Performed by: NURSE PRACTITIONER

## 2022-07-25 RX ORDER — PROPOFOL 10 MG/ML
VIAL (ML) INTRAVENOUS AS NEEDED
Status: DISCONTINUED | OUTPATIENT
Start: 2022-07-25 | End: 2022-07-25 | Stop reason: SURG

## 2022-07-25 RX ORDER — PROPOFOL 10 MG/ML
VIAL (ML) INTRAVENOUS CONTINUOUS PRN
Status: DISCONTINUED | OUTPATIENT
Start: 2022-07-25 | End: 2022-07-25 | Stop reason: SURG

## 2022-07-25 RX ORDER — LIDOCAINE HYDROCHLORIDE 20 MG/ML
INJECTION, SOLUTION INFILTRATION; PERINEURAL AS NEEDED
Status: DISCONTINUED | OUTPATIENT
Start: 2022-07-25 | End: 2022-07-25 | Stop reason: SURG

## 2022-07-25 RX ORDER — SODIUM CHLORIDE 9 MG/ML
30 INJECTION, SOLUTION INTRAVENOUS CONTINUOUS PRN
Status: DISCONTINUED | OUTPATIENT
Start: 2022-07-25 | End: 2022-07-30 | Stop reason: HOSPADM

## 2022-07-25 RX ADMIN — SODIUM CHLORIDE 30 ML/HR: 9 INJECTION, SOLUTION INTRAVENOUS at 11:40

## 2022-07-25 RX ADMIN — LIDOCAINE HYDROCHLORIDE 100 MG: 20 INJECTION, SOLUTION INFILTRATION; PERINEURAL at 12:01

## 2022-07-25 RX ADMIN — OXYCODONE 5 MG: 5 TABLET ORAL at 00:31

## 2022-07-25 RX ADMIN — ONDANSETRON 4 MG: 2 INJECTION INTRAMUSCULAR; INTRAVENOUS at 03:57

## 2022-07-25 RX ADMIN — FUROSEMIDE 80 MG: 10 INJECTION, SOLUTION INTRAMUSCULAR; INTRAVENOUS at 18:14

## 2022-07-25 RX ADMIN — HYDROXYZINE HYDROCHLORIDE 50 MG: 25 TABLET ORAL at 13:43

## 2022-07-25 RX ADMIN — OXYCODONE 5 MG: 5 TABLET ORAL at 13:43

## 2022-07-25 RX ADMIN — Medication 250 MCG/KG/MIN: at 12:01

## 2022-07-25 RX ADMIN — OXYCODONE 5 MG: 5 TABLET ORAL at 19:47

## 2022-07-25 RX ADMIN — Medication 10 ML: at 09:11

## 2022-07-25 RX ADMIN — ALBUMIN HUMAN 12.5 G: 0.25 SOLUTION INTRAVENOUS at 04:07

## 2022-07-25 RX ADMIN — NICOTINE 1 PATCH: 21 PATCH, EXTENDED RELEASE TRANSDERMAL at 09:08

## 2022-07-25 RX ADMIN — HYDROXYZINE HYDROCHLORIDE 50 MG: 25 TABLET ORAL at 01:10

## 2022-07-25 RX ADMIN — PROPOFOL 100 MG: 10 INJECTION, EMULSION INTRAVENOUS at 12:01

## 2022-07-25 RX ADMIN — Medication 10 ML: at 21:14

## 2022-07-25 RX ADMIN — ALBUMIN HUMAN 12.5 G: 0.25 SOLUTION INTRAVENOUS at 17:15

## 2022-07-25 RX ADMIN — FUROSEMIDE 80 MG: 10 INJECTION, SOLUTION INTRAMUSCULAR; INTRAVENOUS at 07:04

## 2022-07-25 NOTE — ANESTHESIA PREPROCEDURE EVALUATION
Anesthesia Evaluation     Patient summary reviewed and Nursing notes reviewed                Airway   Mallampati: I  TM distance: >3 FB  Neck ROM: full  No difficulty expected  Dental - normal exam   (+) lower dentures, edentulous and upper dentures    Pulmonary - normal exam   (+) a smoker Current Abstained day of surgery,   Cardiovascular - normal exam    (+) CAD,     ROS comment: EF 44% with septal hypokinesis    Neuro/Psych- negative ROS  GI/Hepatic/Renal/Endo    (+)   hepatitis, liver disease cirrhosis,     Musculoskeletal (-) negative ROS    Abdominal  - normal exam    Bowel sounds: normal.   Substance History - negative use     OB/GYN negative ob/gyn ROS         Other                        Anesthesia Plan    ASA 4     MAC       Anesthetic plan, risks, benefits, and alternatives have been provided, discussed and informed consent has been obtained with: patient.        CODE STATUS:    Code Status (Patient has no pulse and is not breathing): CPR (Attempt to Resuscitate)  Medical Interventions (Patient has pulse or is breathing): Full Support

## 2022-07-25 NOTE — ANESTHESIA POSTPROCEDURE EVALUATION
"Patient: Maged Pantoja    Procedure Summary     Date: 07/25/22 Room / Location:  [facility] /  [facility]    Anesthesia Start: 1148 Anesthesia Stop: 1213    Procedure: ESOPHAGOGASTRODUODENOSCOPY (N/A Esophagus) Diagnosis:       Esophageal varices      Portal hypertensive gastropathy (HCC)      Duodenogastric bile reflux      (Cirrhosis of liver with ascites, unspecified hepatic cirrhosis type (HCC) [K74.60, R18.8])    Surgeons: Sriram Escalante MD Provider: Juan Stapleton MD    Anesthesia Type: MAC ASA Status: 4          Anesthesia Type: MAC    Vitals  Vitals Value Taken Time   /80 07/25/22 1234   Temp     Pulse 88 07/25/22 1234   Resp 16 07/25/22 1234   SpO2 96 % 07/25/22 1234           Post Anesthesia Care and Evaluation    Patient location during evaluation: PACU  Patient participation: complete - patient participated  Level of consciousness: awake  Pain score: 0  Pain management: adequate    Airway patency: patent  Anesthetic complications: No anesthetic complications  PONV Status: none  Cardiovascular status: acceptable  Respiratory status: acceptable  Hydration status: acceptable    Comments: /93 (BP Location: Left arm, Patient Position: Lying)   Pulse 82   Temp 36.5 °C (97.7 °F) (Oral)   Resp 18   Ht 182.9 cm (72\")   Wt 126 kg (277 lb 11.2 oz)   SpO2 97%   BMI 37.66 kg/m²       "

## 2022-07-26 ENCOUNTER — APPOINTMENT (OUTPATIENT)
Dept: CT IMAGING | Facility: HOSPITAL | Age: 47
End: 2022-07-26

## 2022-07-26 LAB
ANION GAP SERPL CALCULATED.3IONS-SCNC: 5.8 MMOL/L (ref 5–15)
BUN SERPL-MCNC: 25 MG/DL (ref 6–20)
BUN/CREAT SERPL: 23.8 (ref 7–25)
CALCIUM SPEC-SCNC: 7.8 MG/DL (ref 8.6–10.5)
CHLORIDE SERPL-SCNC: 98 MMOL/L (ref 98–107)
CO2 SERPL-SCNC: 23.2 MMOL/L (ref 22–29)
CREAT SERPL-MCNC: 1.05 MG/DL (ref 0.76–1.27)
CV A16 IGG TITR SER IF: ABNORMAL TITER
CV A16 IGM TITR SER IF: NEGATIVE TITER
CV A24 IGG TITR SER IF: ABNORMAL TITER
CV A24 IGM TITR SER IF: NEGATIVE TITER
CV A7 IGG TITR SER IF: ABNORMAL TITER
CV A7 IGM TITR SER IF: NEGATIVE TITER
CV A9 IGG TITR SER IF: ABNORMAL TITER
CV A9 IGM TITR SER IF: NEGATIVE TITER
DEPRECATED RDW RBC AUTO: 48.9 FL (ref 37–54)
EGFRCR SERPLBLD CKD-EPI 2021: 89.2 ML/MIN/1.73
ERYTHROCYTE [DISTWIDTH] IN BLOOD BY AUTOMATED COUNT: 17.5 % (ref 12.3–15.4)
GLUCOSE SERPL-MCNC: 107 MG/DL (ref 65–99)
HBV DNA SERPL NAA+PROBE-ACNC: NORMAL IU/ML
HBV DNA SERPL NAA+PROBE-LOG IU: NORMAL {LOG_IU}/ML
HCT VFR BLD AUTO: 30.8 % (ref 37.5–51)
HGB BLD-MCNC: 10.7 G/DL (ref 13–17.7)
MCH RBC QN AUTO: 27.9 PG (ref 26.6–33)
MCHC RBC AUTO-ENTMCNC: 34.7 G/DL (ref 31.5–35.7)
MCV RBC AUTO: 80.4 FL (ref 79–97)
PLATELET # BLD AUTO: 134 10*3/MM3 (ref 140–450)
PMV BLD AUTO: 10.2 FL (ref 6–12)
POTASSIUM SERPL-SCNC: 4.3 MMOL/L (ref 3.5–5.2)
RBC # BLD AUTO: 3.83 10*6/MM3 (ref 4.14–5.8)
REF LAB TEST REF RANGE: NORMAL
SODIUM SERPL-SCNC: 127 MMOL/L (ref 136–145)
WBC NRBC COR # BLD: 8.01 10*3/MM3 (ref 3.4–10.8)

## 2022-07-26 PROCEDURE — 0 LIDOCAINE 1 % SOLUTION: Performed by: RADIOLOGY

## 2022-07-26 PROCEDURE — 88300 SURGICAL PATH GROSS: CPT | Performed by: INTERNAL MEDICINE

## 2022-07-26 PROCEDURE — 25010000002 MIDAZOLAM PER 1 MG: Performed by: RADIOLOGY

## 2022-07-26 PROCEDURE — 0TB13ZX EXCISION OF LEFT KIDNEY, PERCUTANEOUS APPROACH, DIAGNOSTIC: ICD-10-PCS | Performed by: RADIOLOGY

## 2022-07-26 PROCEDURE — 88341 IMHCHEM/IMCYTCHM EA ADD ANTB: CPT

## 2022-07-26 PROCEDURE — 88350 IMFLUOR EA ADDL 1ANTB STN PX: CPT

## 2022-07-26 PROCEDURE — 25010000002 FUROSEMIDE PER 20 MG: Performed by: INTERNAL MEDICINE

## 2022-07-26 PROCEDURE — 88348 ELECTRON MICROSCOPY DX: CPT

## 2022-07-26 PROCEDURE — 99232 SBSQ HOSP IP/OBS MODERATE 35: CPT | Performed by: INTERNAL MEDICINE

## 2022-07-26 PROCEDURE — 88342 IMHCHEM/IMCYTCHM 1ST ANTB: CPT

## 2022-07-26 PROCEDURE — 25010000002 FENTANYL CITRATE (PF) 50 MCG/ML SOLUTION: Performed by: RADIOLOGY

## 2022-07-26 PROCEDURE — 77012 CT SCAN FOR NEEDLE BIOPSY: CPT

## 2022-07-26 PROCEDURE — 80048 BASIC METABOLIC PNL TOTAL CA: CPT | Performed by: INTERNAL MEDICINE

## 2022-07-26 PROCEDURE — 88313 SPECIAL STAINS GROUP 2: CPT

## 2022-07-26 PROCEDURE — 88305 TISSUE EXAM BY PATHOLOGIST: CPT

## 2022-07-26 PROCEDURE — 88346 IMFLUOR 1ST 1ANTB STAIN PX: CPT

## 2022-07-26 PROCEDURE — 85027 COMPLETE CBC AUTOMATED: CPT | Performed by: INTERNAL MEDICINE

## 2022-07-26 RX ORDER — OXYCODONE HYDROCHLORIDE 5 MG/1
7.5 TABLET ORAL EVERY 6 HOURS PRN
Status: DISCONTINUED | OUTPATIENT
Start: 2022-07-26 | End: 2022-07-30 | Stop reason: HOSPADM

## 2022-07-26 RX ORDER — LIDOCAINE HYDROCHLORIDE 10 MG/ML
20 INJECTION, SOLUTION INFILTRATION; PERINEURAL ONCE
Status: COMPLETED | OUTPATIENT
Start: 2022-07-26 | End: 2022-07-26

## 2022-07-26 RX ORDER — FUROSEMIDE 10 MG/ML
80 INJECTION INTRAMUSCULAR; INTRAVENOUS EVERY 8 HOURS
Status: DISCONTINUED | OUTPATIENT
Start: 2022-07-26 | End: 2022-07-27

## 2022-07-26 RX ORDER — MIDAZOLAM HYDROCHLORIDE 1 MG/ML
INJECTION INTRAMUSCULAR; INTRAVENOUS
Status: COMPLETED | OUTPATIENT
Start: 2022-07-26 | End: 2022-07-26

## 2022-07-26 RX ORDER — FENTANYL CITRATE 50 UG/ML
INJECTION, SOLUTION INTRAMUSCULAR; INTRAVENOUS
Status: COMPLETED | OUTPATIENT
Start: 2022-07-26 | End: 2022-07-26

## 2022-07-26 RX ADMIN — OXYCODONE 7.5 MG: 5 TABLET ORAL at 18:29

## 2022-07-26 RX ADMIN — FUROSEMIDE 80 MG: 10 INJECTION, SOLUTION INTRAMUSCULAR; INTRAVENOUS at 06:26

## 2022-07-26 RX ADMIN — FUROSEMIDE 80 MG: 10 INJECTION, SOLUTION INTRAMUSCULAR; INTRAVENOUS at 23:48

## 2022-07-26 RX ADMIN — MIDAZOLAM 1 MG: 1 INJECTION INTRAMUSCULAR; INTRAVENOUS at 13:21

## 2022-07-26 RX ADMIN — FUROSEMIDE 80 MG: 10 INJECTION, SOLUTION INTRAMUSCULAR; INTRAVENOUS at 14:57

## 2022-07-26 RX ADMIN — FENTANYL CITRATE 50 MCG: 50 INJECTION INTRAMUSCULAR; INTRAVENOUS at 13:22

## 2022-07-26 RX ADMIN — Medication 10 ML: at 08:02

## 2022-07-26 RX ADMIN — OXYCODONE 7.5 MG: 5 TABLET ORAL at 23:48

## 2022-07-26 RX ADMIN — OXYCODONE 5 MG: 5 TABLET ORAL at 08:02

## 2022-07-26 RX ADMIN — LIDOCAINE HYDROCHLORIDE 20 ML: 10 INJECTION, SOLUTION INFILTRATION; PERINEURAL at 13:22

## 2022-07-26 RX ADMIN — Medication 10 ML: at 23:49

## 2022-07-26 RX ADMIN — NICOTINE 1 PATCH: 21 PATCH, EXTENDED RELEASE TRANSDERMAL at 08:03

## 2022-07-27 LAB
ALBUMIN SERPL-MCNC: 1.7 G/DL (ref 3.5–5.2)
ALBUMIN/GLOB SERPL: 0.5 G/DL
ALP SERPL-CCNC: 128 U/L (ref 39–117)
ALT SERPL W P-5'-P-CCNC: 18 U/L (ref 1–41)
ANION GAP SERPL CALCULATED.3IONS-SCNC: 4.9 MMOL/L (ref 5–15)
AST SERPL-CCNC: 36 U/L (ref 1–40)
BILIRUB SERPL-MCNC: 0.4 MG/DL (ref 0–1.2)
BUN SERPL-MCNC: 23 MG/DL (ref 6–20)
BUN/CREAT SERPL: 25.8 (ref 7–25)
C-ANCA TITR SER IF: ABNORMAL TITER
CALCIUM SPEC-SCNC: 7.3 MG/DL (ref 8.6–10.5)
CHLORIDE SERPL-SCNC: 96 MMOL/L (ref 98–107)
CO2 SERPL-SCNC: 26.1 MMOL/L (ref 22–29)
CREAT SERPL-MCNC: 0.89 MG/DL (ref 0.76–1.27)
DEPRECATED RDW RBC AUTO: 54.1 FL (ref 37–54)
EGFRCR SERPLBLD CKD-EPI 2021: 107.7 ML/MIN/1.73
ERYTHROCYTE [DISTWIDTH] IN BLOOD BY AUTOMATED COUNT: 18.2 % (ref 12.3–15.4)
GLOBULIN UR ELPH-MCNC: 3.3 GM/DL
GLUCOSE SERPL-MCNC: 154 MG/DL (ref 65–99)
HCT VFR BLD AUTO: 29.1 % (ref 37.5–51)
HCV RNA SERPL NAA+PROBE-ACNC: NORMAL IU/ML
HGB BLD-MCNC: 9.9 G/DL (ref 13–17.7)
MCH RBC QN AUTO: 28.5 PG (ref 26.6–33)
MCHC RBC AUTO-ENTMCNC: 34 G/DL (ref 31.5–35.7)
MCV RBC AUTO: 83.9 FL (ref 79–97)
MYELOPEROXIDASE AB SER IA-ACNC: <0.2 UNITS (ref 0–0.9)
P-ANCA ATYPICAL TITR SER IF: ABNORMAL TITER
P-ANCA TITR SER IF: ABNORMAL TITER
PLATELET # BLD AUTO: 106 10*3/MM3 (ref 140–450)
PMV BLD AUTO: 9.8 FL (ref 6–12)
POTASSIUM SERPL-SCNC: 3.5 MMOL/L (ref 3.5–5.2)
POTASSIUM SERPL-SCNC: 3.9 MMOL/L (ref 3.5–5.2)
PROT SERPL-MCNC: 5 G/DL (ref 6–8.5)
PROTEINASE3 AB SER IA-ACNC: <0.2 UNITS (ref 0–0.9)
RBC # BLD AUTO: 3.47 10*6/MM3 (ref 4.14–5.8)
SODIUM SERPL-SCNC: 127 MMOL/L (ref 136–145)
TEST INFORMATION: NORMAL
WBC NRBC COR # BLD: 6.78 10*3/MM3 (ref 3.4–10.8)

## 2022-07-27 PROCEDURE — 25010000002 FUROSEMIDE PER 20 MG: Performed by: INTERNAL MEDICINE

## 2022-07-27 PROCEDURE — 85027 COMPLETE CBC AUTOMATED: CPT | Performed by: NURSE PRACTITIONER

## 2022-07-27 PROCEDURE — 99232 SBSQ HOSP IP/OBS MODERATE 35: CPT | Performed by: INTERNAL MEDICINE

## 2022-07-27 PROCEDURE — 99232 SBSQ HOSP IP/OBS MODERATE 35: CPT | Performed by: NURSE PRACTITIONER

## 2022-07-27 PROCEDURE — 80053 COMPREHEN METABOLIC PANEL: CPT | Performed by: NURSE PRACTITIONER

## 2022-07-27 PROCEDURE — 84132 ASSAY OF SERUM POTASSIUM: CPT | Performed by: INTERNAL MEDICINE

## 2022-07-27 RX ORDER — FUROSEMIDE 10 MG/ML
80 INJECTION INTRAMUSCULAR; INTRAVENOUS EVERY 6 HOURS
Status: DISCONTINUED | OUTPATIENT
Start: 2022-07-27 | End: 2022-07-30 | Stop reason: HOSPADM

## 2022-07-27 RX ORDER — POTASSIUM CHLORIDE 1.5 G/1.77G
40 POWDER, FOR SOLUTION ORAL AS NEEDED
Status: DISCONTINUED | OUTPATIENT
Start: 2022-07-27 | End: 2022-07-30 | Stop reason: HOSPADM

## 2022-07-27 RX ORDER — POTASSIUM CHLORIDE 750 MG/1
40 TABLET, FILM COATED, EXTENDED RELEASE ORAL AS NEEDED
Status: DISCONTINUED | OUTPATIENT
Start: 2022-07-27 | End: 2022-07-30 | Stop reason: HOSPADM

## 2022-07-27 RX ADMIN — OXYCODONE 7.5 MG: 5 TABLET ORAL at 16:46

## 2022-07-27 RX ADMIN — OXYCODONE 7.5 MG: 5 TABLET ORAL at 10:10

## 2022-07-27 RX ADMIN — POTASSIUM CHLORIDE 40 MEQ: 750 TABLET, EXTENDED RELEASE ORAL at 16:47

## 2022-07-27 RX ADMIN — Medication 10 ML: at 21:58

## 2022-07-27 RX ADMIN — ACETAMINOPHEN 650 MG: 325 TABLET, FILM COATED ORAL at 13:41

## 2022-07-27 RX ADMIN — FUROSEMIDE 80 MG: 10 INJECTION, SOLUTION INTRAMUSCULAR; INTRAVENOUS at 09:18

## 2022-07-27 RX ADMIN — FUROSEMIDE 80 MG: 10 INJECTION, SOLUTION INTRAMUSCULAR; INTRAVENOUS at 17:11

## 2022-07-27 RX ADMIN — OXYCODONE 7.5 MG: 5 TABLET ORAL at 22:53

## 2022-07-27 RX ADMIN — POTASSIUM CHLORIDE 40 MEQ: 750 TABLET, EXTENDED RELEASE ORAL at 13:35

## 2022-07-27 RX ADMIN — NICOTINE 1 PATCH: 21 PATCH, EXTENDED RELEASE TRANSDERMAL at 09:25

## 2022-07-27 RX ADMIN — Medication 10 ML: at 09:18

## 2022-07-27 RX ADMIN — FUROSEMIDE 80 MG: 10 INJECTION, SOLUTION INTRAMUSCULAR; INTRAVENOUS at 21:58

## 2022-07-27 RX ADMIN — ALUMINA, MAGNESIA, AND SIMETHICONE 15 ML: 2400; 2400; 240 SUSPENSION ORAL at 22:55

## 2022-07-28 LAB
ALBUMIN SERPL-MCNC: 2 G/DL (ref 3.5–5.2)
ALBUMIN/GLOB SERPL: 0.6 G/DL
ALP SERPL-CCNC: 149 U/L (ref 39–117)
ALT SERPL W P-5'-P-CCNC: 21 U/L (ref 1–41)
ANION GAP SERPL CALCULATED.3IONS-SCNC: 6.6 MMOL/L (ref 5–15)
AST SERPL-CCNC: 40 U/L (ref 1–40)
BILIRUB SERPL-MCNC: 0.3 MG/DL (ref 0–1.2)
BUN SERPL-MCNC: 22 MG/DL (ref 6–20)
BUN/CREAT SERPL: 22.2 (ref 7–25)
CALCIUM SPEC-SCNC: 7.4 MG/DL (ref 8.6–10.5)
CHLORIDE SERPL-SCNC: 97 MMOL/L (ref 98–107)
CO2 SERPL-SCNC: 27.4 MMOL/L (ref 22–29)
CREAT SERPL-MCNC: 0.99 MG/DL (ref 0.76–1.27)
DEPRECATED RDW RBC AUTO: 50 FL (ref 37–54)
EGFRCR SERPLBLD CKD-EPI 2021: 95.7 ML/MIN/1.73
ERYTHROCYTE [DISTWIDTH] IN BLOOD BY AUTOMATED COUNT: 17.6 % (ref 12.3–15.4)
GLOBULIN UR ELPH-MCNC: 3.2 GM/DL
GLUCOSE SERPL-MCNC: 122 MG/DL (ref 65–99)
HCT VFR BLD AUTO: 28 % (ref 37.5–51)
HGB BLD-MCNC: 9.7 G/DL (ref 13–17.7)
MAGNESIUM SERPL-MCNC: 1.7 MG/DL (ref 1.6–2.6)
MCH RBC QN AUTO: 28.2 PG (ref 26.6–33)
MCHC RBC AUTO-ENTMCNC: 34.6 G/DL (ref 31.5–35.7)
MCV RBC AUTO: 81.4 FL (ref 79–97)
PLATELET # BLD AUTO: 111 10*3/MM3 (ref 140–450)
PMV BLD AUTO: 10.3 FL (ref 6–12)
POTASSIUM SERPL-SCNC: 4.3 MMOL/L (ref 3.5–5.2)
PROT SERPL-MCNC: 5.2 G/DL (ref 6–8.5)
RBC # BLD AUTO: 3.44 10*6/MM3 (ref 4.14–5.8)
SODIUM SERPL-SCNC: 131 MMOL/L (ref 136–145)
WBC NRBC COR # BLD: 7.08 10*3/MM3 (ref 3.4–10.8)

## 2022-07-28 PROCEDURE — 80053 COMPREHEN METABOLIC PANEL: CPT | Performed by: INTERNAL MEDICINE

## 2022-07-28 PROCEDURE — 85027 COMPLETE CBC AUTOMATED: CPT | Performed by: INTERNAL MEDICINE

## 2022-07-28 PROCEDURE — 99232 SBSQ HOSP IP/OBS MODERATE 35: CPT | Performed by: NURSE PRACTITIONER

## 2022-07-28 PROCEDURE — 25010000002 FUROSEMIDE PER 20 MG: Performed by: INTERNAL MEDICINE

## 2022-07-28 PROCEDURE — 83735 ASSAY OF MAGNESIUM: CPT | Performed by: INTERNAL MEDICINE

## 2022-07-28 RX ORDER — METOLAZONE 5 MG/1
10 TABLET ORAL ONCE
Status: COMPLETED | OUTPATIENT
Start: 2022-07-28 | End: 2022-07-28

## 2022-07-28 RX ADMIN — Medication 10 ML: at 08:29

## 2022-07-28 RX ADMIN — FUROSEMIDE 80 MG: 10 INJECTION, SOLUTION INTRAMUSCULAR; INTRAVENOUS at 15:53

## 2022-07-28 RX ADMIN — OXYCODONE 7.5 MG: 5 TABLET ORAL at 18:17

## 2022-07-28 RX ADMIN — FUROSEMIDE 80 MG: 10 INJECTION, SOLUTION INTRAMUSCULAR; INTRAVENOUS at 03:43

## 2022-07-28 RX ADMIN — ALUMINA, MAGNESIA, AND SIMETHICONE 15 ML: 2400; 2400; 240 SUSPENSION ORAL at 08:30

## 2022-07-28 RX ADMIN — NICOTINE 1 PATCH: 21 PATCH, EXTENDED RELEASE TRANSDERMAL at 10:42

## 2022-07-28 RX ADMIN — FUROSEMIDE 80 MG: 10 INJECTION, SOLUTION INTRAMUSCULAR; INTRAVENOUS at 10:44

## 2022-07-28 RX ADMIN — METOLAZONE 10 MG: 5 TABLET ORAL at 15:56

## 2022-07-28 RX ADMIN — Medication 10 ML: at 22:25

## 2022-07-28 RX ADMIN — OXYCODONE 7.5 MG: 5 TABLET ORAL at 08:30

## 2022-07-28 RX ADMIN — FUROSEMIDE 80 MG: 10 INJECTION, SOLUTION INTRAMUSCULAR; INTRAVENOUS at 22:25

## 2022-07-29 ENCOUNTER — APPOINTMENT (OUTPATIENT)
Dept: NUCLEAR MEDICINE | Facility: HOSPITAL | Age: 47
End: 2022-07-29

## 2022-07-29 VITALS
HEIGHT: 72 IN | OXYGEN SATURATION: 93 % | BODY MASS INDEX: 38.91 KG/M2 | WEIGHT: 287.3 LBS | HEART RATE: 96 BPM | RESPIRATION RATE: 16 BRPM | TEMPERATURE: 98.2 F | SYSTOLIC BLOOD PRESSURE: 124 MMHG | DIASTOLIC BLOOD PRESSURE: 88 MMHG

## 2022-07-29 LAB
ALBUMIN SERPL-MCNC: 1.7 G/DL (ref 3.5–5.2)
ANION GAP SERPL CALCULATED.3IONS-SCNC: 6 MMOL/L (ref 5–15)
BH CV REST NUCLEAR ISOTOPE DOSE: 10.6 MCI
BH CV STRESS COMMENTS STAGE 1: NORMAL
BH CV STRESS DOSE REGADENOSON STAGE 1: 0.4
BH CV STRESS DURATION MIN STAGE 1: 0
BH CV STRESS DURATION SEC STAGE 1: 10
BH CV STRESS NUCLEAR ISOTOPE DOSE: 31 MCI
BH CV STRESS PROTOCOL 1: NORMAL
BH CV STRESS RECOVERY BP: NORMAL MMHG
BH CV STRESS RECOVERY HR: 89 BPM
BH CV STRESS STAGE 1: 1
BUN SERPL-MCNC: 21 MG/DL (ref 6–20)
BUN/CREAT SERPL: 23.1 (ref 7–25)
CALCIUM SPEC-SCNC: 7.5 MG/DL (ref 8.6–10.5)
CHLORIDE SERPL-SCNC: 97 MMOL/L (ref 98–107)
CO2 SERPL-SCNC: 30 MMOL/L (ref 22–29)
CREAT SERPL-MCNC: 0.91 MG/DL (ref 0.76–1.27)
DEPRECATED RDW RBC AUTO: 50.5 FL (ref 37–54)
EGFRCR SERPLBLD CKD-EPI 2021: 105.9 ML/MIN/1.73
ERYTHROCYTE [DISTWIDTH] IN BLOOD BY AUTOMATED COUNT: 17.5 % (ref 12.3–15.4)
GLUCOSE SERPL-MCNC: 124 MG/DL (ref 65–99)
HCT VFR BLD AUTO: 28.5 % (ref 37.5–51)
HGB BLD-MCNC: 9.7 G/DL (ref 13–17.7)
LV EF NUC BP: 60 %
MAGNESIUM SERPL-MCNC: 1.7 MG/DL (ref 1.6–2.6)
MAXIMAL PREDICTED HEART RATE: 175 BPM
MCH RBC QN AUTO: 27.8 PG (ref 26.6–33)
MCHC RBC AUTO-ENTMCNC: 34 G/DL (ref 31.5–35.7)
MCV RBC AUTO: 81.7 FL (ref 79–97)
PERCENT MAX PREDICTED HR: 55.43 %
PHOSPHATE SERPL-MCNC: 3.2 MG/DL (ref 2.5–4.5)
PLATELET # BLD AUTO: 106 10*3/MM3 (ref 140–450)
PMV BLD AUTO: 10.2 FL (ref 6–12)
POTASSIUM SERPL-SCNC: 3.3 MMOL/L (ref 3.5–5.2)
RBC # BLD AUTO: 3.49 10*6/MM3 (ref 4.14–5.8)
SODIUM SERPL-SCNC: 133 MMOL/L (ref 136–145)
STRESS BASELINE BP: NORMAL MMHG
STRESS BASELINE HR: 86 BPM
STRESS PERCENT HR: 65 %
STRESS POST PEAK BP: NORMAL MMHG
STRESS POST PEAK HR: 97 BPM
STRESS TARGET HR: 149 BPM
WBC NRBC COR # BLD: 5.69 10*3/MM3 (ref 3.4–10.8)

## 2022-07-29 PROCEDURE — 83735 ASSAY OF MAGNESIUM: CPT | Performed by: INTERNAL MEDICINE

## 2022-07-29 PROCEDURE — A9500 TC99M SESTAMIBI: HCPCS | Performed by: INTERNAL MEDICINE

## 2022-07-29 PROCEDURE — 93017 CV STRESS TEST TRACING ONLY: CPT

## 2022-07-29 PROCEDURE — 25010000002 FUROSEMIDE PER 20 MG: Performed by: INTERNAL MEDICINE

## 2022-07-29 PROCEDURE — 85027 COMPLETE CBC AUTOMATED: CPT | Performed by: INTERNAL MEDICINE

## 2022-07-29 PROCEDURE — 93016 CV STRESS TEST SUPVJ ONLY: CPT | Performed by: INTERNAL MEDICINE

## 2022-07-29 PROCEDURE — 78452 HT MUSCLE IMAGE SPECT MULT: CPT

## 2022-07-29 PROCEDURE — 25010000002 REGADENOSON 0.4 MG/5ML SOLUTION: Performed by: INTERNAL MEDICINE

## 2022-07-29 PROCEDURE — 99231 SBSQ HOSP IP/OBS SF/LOW 25: CPT | Performed by: NURSE PRACTITIONER

## 2022-07-29 PROCEDURE — 80069 RENAL FUNCTION PANEL: CPT | Performed by: INTERNAL MEDICINE

## 2022-07-29 PROCEDURE — 78452 HT MUSCLE IMAGE SPECT MULT: CPT | Performed by: INTERNAL MEDICINE

## 2022-07-29 PROCEDURE — 0 TECHNETIUM SESTAMIBI: Performed by: INTERNAL MEDICINE

## 2022-07-29 PROCEDURE — 93018 CV STRESS TEST I&R ONLY: CPT | Performed by: INTERNAL MEDICINE

## 2022-07-29 RX ORDER — METOLAZONE 5 MG/1
5 TABLET ORAL ONCE
Status: COMPLETED | OUTPATIENT
Start: 2022-07-29 | End: 2022-07-29

## 2022-07-29 RX ADMIN — OXYCODONE 7.5 MG: 5 TABLET ORAL at 03:26

## 2022-07-29 RX ADMIN — Medication 10 ML: at 22:15

## 2022-07-29 RX ADMIN — FUROSEMIDE 80 MG: 10 INJECTION, SOLUTION INTRAMUSCULAR; INTRAVENOUS at 03:25

## 2022-07-29 RX ADMIN — POTASSIUM CHLORIDE 40 MEQ: 750 TABLET, EXTENDED RELEASE ORAL at 13:21

## 2022-07-29 RX ADMIN — FUROSEMIDE 80 MG: 10 INJECTION, SOLUTION INTRAMUSCULAR; INTRAVENOUS at 08:35

## 2022-07-29 RX ADMIN — TECHNETIUM TC 99M SESTAMIBI 1 DOSE: 1 INJECTION INTRAVENOUS at 06:31

## 2022-07-29 RX ADMIN — REGADENOSON 0.4 MG: 0.08 INJECTION, SOLUTION INTRAVENOUS at 10:25

## 2022-07-29 RX ADMIN — METOLAZONE 5 MG: 5 TABLET ORAL at 13:28

## 2022-07-29 RX ADMIN — POTASSIUM CHLORIDE 40 MEQ: 750 TABLET, EXTENDED RELEASE ORAL at 08:34

## 2022-07-29 RX ADMIN — OXYCODONE 7.5 MG: 5 TABLET ORAL at 13:58

## 2022-07-29 RX ADMIN — FUROSEMIDE 80 MG: 10 INJECTION, SOLUTION INTRAMUSCULAR; INTRAVENOUS at 22:16

## 2022-07-29 RX ADMIN — NICOTINE 1 PATCH: 21 PATCH, EXTENDED RELEASE TRANSDERMAL at 08:37

## 2022-07-29 RX ADMIN — FUROSEMIDE 80 MG: 10 INJECTION, SOLUTION INTRAMUSCULAR; INTRAVENOUS at 16:08

## 2022-07-29 RX ADMIN — OXYCODONE 7.5 MG: 5 TABLET ORAL at 22:21

## 2022-07-29 RX ADMIN — TECHNETIUM TC 99M SESTAMIBI 1 DOSE: 1 INJECTION INTRAVENOUS at 10:25

## 2022-07-29 RX ADMIN — Medication 10 ML: at 08:37

## 2022-07-30 ENCOUNTER — DOCUMENTATION (OUTPATIENT)
Dept: INTERNAL MEDICINE | Facility: HOSPITAL | Age: 47
End: 2022-07-30

## 2022-08-01 LAB
LAB AP CASE REPORT: NORMAL
LAB AP CLINICAL INFORMATION: NORMAL
PATH REPORT.ADDENDUM SPEC: NORMAL
PATH REPORT.FINAL DX SPEC: NORMAL
PATH REPORT.GROSS SPEC: NORMAL

## 2022-08-01 NOTE — CASE MANAGEMENT/SOCIAL WORK
Case Management Discharge Note      Final Note: Patient left AMA         Selected Continued Care - Discharged on 7/30/2022 Admission date: 7/22/2022 - Discharge disposition: Left Against Medical Advice    Destination    No services have been selected for the patient.              Durable Medical Equipment    No services have been selected for the patient.              Dialysis/Infusion    No services have been selected for the patient.              Home Medical Care    No services have been selected for the patient.              Therapy    No services have been selected for the patient.              Community Resources    No services have been selected for the patient.              Community & DME    No services have been selected for the patient.                       Final Discharge Disposition Code: 07 - left AMA

## 2022-08-06 ENCOUNTER — HOSPITAL ENCOUNTER (INPATIENT)
Facility: HOSPITAL | Age: 47
LOS: 3 days | Discharge: HOME | End: 2022-08-10
Attending: EMERGENCY MEDICINE | Admitting: HOSPITALIST

## 2022-08-06 ENCOUNTER — APPOINTMENT (OUTPATIENT)
Dept: GENERAL RADIOLOGY | Facility: HOSPITAL | Age: 47
End: 2022-08-06

## 2022-08-06 DIAGNOSIS — E87.6 HYPOKALEMIA: ICD-10-CM

## 2022-08-06 DIAGNOSIS — R60.1 ANASARCA: Primary | ICD-10-CM

## 2022-08-06 DIAGNOSIS — R06.09 DYSPNEA ON EXERTION: ICD-10-CM

## 2022-08-06 DIAGNOSIS — E88.09 HYPOALBUMINEMIA: ICD-10-CM

## 2022-08-06 LAB
ALBUMIN SERPL-MCNC: 2.1 G/DL (ref 3.5–5.2)
ALBUMIN/GLOB SERPL: 0.5 G/DL
ALP SERPL-CCNC: 121 U/L (ref 39–117)
ALT SERPL W P-5'-P-CCNC: 21 U/L (ref 1–41)
ANION GAP SERPL CALCULATED.3IONS-SCNC: 7.8 MMOL/L (ref 5–15)
AST SERPL-CCNC: 46 U/L (ref 1–40)
BASOPHILS # BLD AUTO: 0.06 10*3/MM3 (ref 0–0.2)
BASOPHILS NFR BLD AUTO: 1.1 % (ref 0–1.5)
BILIRUB SERPL-MCNC: 0.2 MG/DL (ref 0–1.2)
BUN SERPL-MCNC: 9 MG/DL (ref 6–20)
BUN/CREAT SERPL: 12.9 (ref 7–25)
CALCIUM SPEC-SCNC: 7.9 MG/DL (ref 8.6–10.5)
CHLORIDE SERPL-SCNC: 103 MMOL/L (ref 98–107)
CO2 SERPL-SCNC: 31.2 MMOL/L (ref 22–29)
CREAT SERPL-MCNC: 0.7 MG/DL (ref 0.76–1.27)
DEPRECATED RDW RBC AUTO: 53.8 FL (ref 37–54)
EGFRCR SERPLBLD CKD-EPI 2021: 115.8 ML/MIN/1.73
EOSINOPHIL # BLD AUTO: 0.11 10*3/MM3 (ref 0–0.4)
EOSINOPHIL NFR BLD AUTO: 2 % (ref 0.3–6.2)
ERYTHROCYTE [DISTWIDTH] IN BLOOD BY AUTOMATED COUNT: 18 % (ref 12.3–15.4)
GLOBULIN UR ELPH-MCNC: 3.9 GM/DL
GLUCOSE SERPL-MCNC: 115 MG/DL (ref 65–99)
HCT VFR BLD AUTO: 31 % (ref 37.5–51)
HGB BLD-MCNC: 10.5 G/DL (ref 13–17.7)
IMM GRANULOCYTES # BLD AUTO: 0.01 10*3/MM3 (ref 0–0.05)
IMM GRANULOCYTES NFR BLD AUTO: 0.2 % (ref 0–0.5)
LYMPHOCYTES # BLD AUTO: 0.94 10*3/MM3 (ref 0.7–3.1)
LYMPHOCYTES NFR BLD AUTO: 17.4 % (ref 19.6–45.3)
MCH RBC QN AUTO: 28.8 PG (ref 26.6–33)
MCHC RBC AUTO-ENTMCNC: 33.9 G/DL (ref 31.5–35.7)
MCV RBC AUTO: 84.9 FL (ref 79–97)
MONOCYTES # BLD AUTO: 0.44 10*3/MM3 (ref 0.1–0.9)
MONOCYTES NFR BLD AUTO: 8.1 % (ref 5–12)
NEUTROPHILS NFR BLD AUTO: 3.84 10*3/MM3 (ref 1.7–7)
NEUTROPHILS NFR BLD AUTO: 71.2 % (ref 42.7–76)
NRBC BLD AUTO-RTO: 0 /100 WBC (ref 0–0.2)
NT-PROBNP SERPL-MCNC: 262 PG/ML (ref 0–450)
PLATELET # BLD AUTO: 157 10*3/MM3 (ref 140–450)
PMV BLD AUTO: 10.1 FL (ref 6–12)
POTASSIUM SERPL-SCNC: 3 MMOL/L (ref 3.5–5.2)
PROT SERPL-MCNC: 6 G/DL (ref 6–8.5)
RBC # BLD AUTO: 3.65 10*6/MM3 (ref 4.14–5.8)
SODIUM SERPL-SCNC: 142 MMOL/L (ref 136–145)
TROPONIN T SERPL-MCNC: <0.01 NG/ML (ref 0–0.03)
WBC NRBC COR # BLD: 5.4 10*3/MM3 (ref 3.4–10.8)

## 2022-08-06 PROCEDURE — 99284 EMERGENCY DEPT VISIT MOD MDM: CPT

## 2022-08-06 PROCEDURE — 85025 COMPLETE CBC W/AUTO DIFF WBC: CPT | Performed by: EMERGENCY MEDICINE

## 2022-08-06 PROCEDURE — 83735 ASSAY OF MAGNESIUM: CPT | Performed by: EMERGENCY MEDICINE

## 2022-08-06 PROCEDURE — 71045 X-RAY EXAM CHEST 1 VIEW: CPT

## 2022-08-06 PROCEDURE — 83880 ASSAY OF NATRIURETIC PEPTIDE: CPT | Performed by: EMERGENCY MEDICINE

## 2022-08-06 PROCEDURE — 84484 ASSAY OF TROPONIN QUANT: CPT | Performed by: EMERGENCY MEDICINE

## 2022-08-06 PROCEDURE — 25010000002 FUROSEMIDE PER 20 MG: Performed by: EMERGENCY MEDICINE

## 2022-08-06 PROCEDURE — 80053 COMPREHEN METABOLIC PANEL: CPT | Performed by: EMERGENCY MEDICINE

## 2022-08-06 PROCEDURE — 93005 ELECTROCARDIOGRAM TRACING: CPT

## 2022-08-06 PROCEDURE — 93010 ELECTROCARDIOGRAM REPORT: CPT | Performed by: INTERNAL MEDICINE

## 2022-08-06 RX ORDER — FUROSEMIDE 10 MG/ML
80 INJECTION INTRAMUSCULAR; INTRAVENOUS ONCE
Status: COMPLETED | OUTPATIENT
Start: 2022-08-06 | End: 2022-08-06

## 2022-08-06 RX ORDER — DIPHENHYDRAMINE HCL 25 MG
50 CAPSULE ORAL ONCE
Status: DISCONTINUED | OUTPATIENT
Start: 2022-08-06 | End: 2022-08-06

## 2022-08-06 RX ORDER — POTASSIUM CHLORIDE 750 MG/1
40 TABLET, FILM COATED, EXTENDED RELEASE ORAL ONCE
Status: COMPLETED | OUTPATIENT
Start: 2022-08-06 | End: 2022-08-06

## 2022-08-06 RX ORDER — SODIUM CHLORIDE 0.9 % (FLUSH) 0.9 %
10 SYRINGE (ML) INJECTION AS NEEDED
Status: DISCONTINUED | OUTPATIENT
Start: 2022-08-06 | End: 2022-08-10 | Stop reason: HOSPADM

## 2022-08-06 RX ORDER — ACETAMINOPHEN AND CODEINE PHOSPHATE 300; 30 MG/1; MG/1
1 TABLET ORAL ONCE
Status: DISCONTINUED | OUTPATIENT
Start: 2022-08-06 | End: 2022-08-06

## 2022-08-06 RX ADMIN — POTASSIUM CHLORIDE 40 MEQ: 750 TABLET, EXTENDED RELEASE ORAL at 23:55

## 2022-08-06 RX ADMIN — FUROSEMIDE 80 MG: 10 INJECTION, SOLUTION INTRAMUSCULAR; INTRAVENOUS at 23:57

## 2022-08-07 PROBLEM — I50.23 ACUTE ON CHRONIC SYSTOLIC CHF (CONGESTIVE HEART FAILURE): Status: ACTIVE | Noted: 2022-08-07

## 2022-08-07 PROBLEM — N04.9 NEPHROTIC SYNDROME: Status: ACTIVE | Noted: 2022-08-07

## 2022-08-07 PROBLEM — N05.2 MEMBRANOUS GLOMERULONEPHRITIS: Status: ACTIVE | Noted: 2022-08-07

## 2022-08-07 LAB
ANION GAP SERPL CALCULATED.3IONS-SCNC: 7 MMOL/L (ref 5–15)
BASOPHILS # BLD AUTO: 0.05 10*3/MM3 (ref 0–0.2)
BASOPHILS NFR BLD AUTO: 1.3 % (ref 0–1.5)
BUN SERPL-MCNC: 9 MG/DL (ref 6–20)
BUN/CREAT SERPL: 14.1 (ref 7–25)
CALCIUM SPEC-SCNC: 7.4 MG/DL (ref 8.6–10.5)
CHLORIDE SERPL-SCNC: 102 MMOL/L (ref 98–107)
CO2 SERPL-SCNC: 31 MMOL/L (ref 22–29)
CREAT SERPL-MCNC: 0.64 MG/DL (ref 0.76–1.27)
DEPRECATED RDW RBC AUTO: 53.6 FL (ref 37–54)
EGFRCR SERPLBLD CKD-EPI 2021: 119 ML/MIN/1.73
EOSINOPHIL # BLD AUTO: 0.15 10*3/MM3 (ref 0–0.4)
EOSINOPHIL NFR BLD AUTO: 3.8 % (ref 0.3–6.2)
ERYTHROCYTE [DISTWIDTH] IN BLOOD BY AUTOMATED COUNT: 17.7 % (ref 12.3–15.4)
GLUCOSE SERPL-MCNC: 110 MG/DL (ref 65–99)
HCT VFR BLD AUTO: 28.2 % (ref 37.5–51)
HGB BLD-MCNC: 9.5 G/DL (ref 13–17.7)
IMM GRANULOCYTES # BLD AUTO: 0.02 10*3/MM3 (ref 0–0.05)
IMM GRANULOCYTES NFR BLD AUTO: 0.5 % (ref 0–0.5)
INR PPP: 1.15 (ref 0.9–1.1)
LYMPHOCYTES # BLD AUTO: 1.07 10*3/MM3 (ref 0.7–3.1)
LYMPHOCYTES NFR BLD AUTO: 27.4 % (ref 19.6–45.3)
MAGNESIUM SERPL-MCNC: 1.7 MG/DL (ref 1.6–2.6)
MAGNESIUM SERPL-MCNC: 1.9 MG/DL (ref 1.6–2.6)
MCH RBC QN AUTO: 28.8 PG (ref 26.6–33)
MCHC RBC AUTO-ENTMCNC: 33.7 G/DL (ref 31.5–35.7)
MCV RBC AUTO: 85.5 FL (ref 79–97)
MONOCYTES # BLD AUTO: 0.43 10*3/MM3 (ref 0.1–0.9)
MONOCYTES NFR BLD AUTO: 11 % (ref 5–12)
NEUTROPHILS NFR BLD AUTO: 2.19 10*3/MM3 (ref 1.7–7)
NEUTROPHILS NFR BLD AUTO: 56 % (ref 42.7–76)
NRBC BLD AUTO-RTO: 0 /100 WBC (ref 0–0.2)
PLATELET # BLD AUTO: 129 10*3/MM3 (ref 140–450)
PMV BLD AUTO: 9.7 FL (ref 6–12)
POTASSIUM SERPL-SCNC: 2.9 MMOL/L (ref 3.5–5.2)
PROTHROMBIN TIME: 14.6 SECONDS (ref 11.7–14.2)
QT INTERVAL: 383 MS
RBC # BLD AUTO: 3.3 10*6/MM3 (ref 4.14–5.8)
SARS-COV-2 ORF1AB RESP QL NAA+PROBE: NOT DETECTED
SODIUM SERPL-SCNC: 140 MMOL/L (ref 136–145)
WBC NRBC COR # BLD: 3.91 10*3/MM3 (ref 3.4–10.8)

## 2022-08-07 PROCEDURE — 36415 COLL VENOUS BLD VENIPUNCTURE: CPT | Performed by: NURSE PRACTITIONER

## 2022-08-07 PROCEDURE — 83735 ASSAY OF MAGNESIUM: CPT | Performed by: NURSE PRACTITIONER

## 2022-08-07 PROCEDURE — 84132 ASSAY OF SERUM POTASSIUM: CPT | Performed by: HOSPITALIST

## 2022-08-07 PROCEDURE — 25010000002 FUROSEMIDE PER 20 MG: Performed by: INTERNAL MEDICINE

## 2022-08-07 PROCEDURE — 80053 COMPREHEN METABOLIC PANEL: CPT | Performed by: NURSE PRACTITIONER

## 2022-08-07 PROCEDURE — 85025 COMPLETE CBC W/AUTO DIFF WBC: CPT | Performed by: NURSE PRACTITIONER

## 2022-08-07 PROCEDURE — 85025 COMPLETE CBC W/AUTO DIFF WBC: CPT | Performed by: HOSPITALIST

## 2022-08-07 PROCEDURE — 25010000002 ENOXAPARIN PER 10 MG: Performed by: NURSE PRACTITIONER

## 2022-08-07 PROCEDURE — 99254 IP/OBS CNSLTJ NEW/EST MOD 60: CPT | Performed by: INTERNAL MEDICINE

## 2022-08-07 PROCEDURE — 25010000002 FUROSEMIDE PER 20 MG: Performed by: NURSE PRACTITIONER

## 2022-08-07 PROCEDURE — U0004 COV-19 TEST NON-CDC HGH THRU: HCPCS | Performed by: EMERGENCY MEDICINE

## 2022-08-07 PROCEDURE — 85610 PROTHROMBIN TIME: CPT | Performed by: NURSE PRACTITIONER

## 2022-08-07 RX ORDER — POTASSIUM CHLORIDE 750 MG/1
40 TABLET, FILM COATED, EXTENDED RELEASE ORAL 2 TIMES DAILY WITH MEALS
Status: DISCONTINUED | OUTPATIENT
Start: 2022-08-07 | End: 2022-08-07

## 2022-08-07 RX ORDER — FUROSEMIDE 10 MG/ML
80 INJECTION INTRAMUSCULAR; INTRAVENOUS EVERY 12 HOURS
Status: DISCONTINUED | OUTPATIENT
Start: 2022-08-07 | End: 2022-08-08

## 2022-08-07 RX ORDER — POTASSIUM CHLORIDE 750 MG/1
40 TABLET, FILM COATED, EXTENDED RELEASE ORAL 2 TIMES DAILY WITH MEALS
Status: COMPLETED | OUTPATIENT
Start: 2022-08-07 | End: 2022-08-10

## 2022-08-07 RX ORDER — ONDANSETRON 2 MG/ML
4 INJECTION INTRAMUSCULAR; INTRAVENOUS EVERY 6 HOURS PRN
Status: DISCONTINUED | OUTPATIENT
Start: 2022-08-07 | End: 2022-08-10 | Stop reason: HOSPADM

## 2022-08-07 RX ORDER — POTASSIUM CHLORIDE 750 MG/1
40 TABLET, FILM COATED, EXTENDED RELEASE ORAL AS NEEDED
Status: DISCONTINUED | OUTPATIENT
Start: 2022-08-07 | End: 2022-08-10 | Stop reason: HOSPADM

## 2022-08-07 RX ORDER — FUROSEMIDE 10 MG/ML
40 INJECTION INTRAMUSCULAR; INTRAVENOUS EVERY 12 HOURS
Status: DISCONTINUED | OUTPATIENT
Start: 2022-08-07 | End: 2022-08-07

## 2022-08-07 RX ORDER — SODIUM CHLORIDE 0.9 % (FLUSH) 0.9 %
10 SYRINGE (ML) INJECTION EVERY 12 HOURS SCHEDULED
Status: DISCONTINUED | OUTPATIENT
Start: 2022-08-07 | End: 2022-08-10 | Stop reason: HOSPADM

## 2022-08-07 RX ORDER — HYDROCODONE BITARTRATE AND ACETAMINOPHEN 5; 325 MG/1; MG/1
1 TABLET ORAL EVERY 6 HOURS PRN
Status: DISCONTINUED | OUTPATIENT
Start: 2022-08-07 | End: 2022-08-07

## 2022-08-07 RX ORDER — ENOXAPARIN SODIUM 100 MG/ML
40 INJECTION SUBCUTANEOUS DAILY
Status: DISCONTINUED | OUTPATIENT
Start: 2022-08-07 | End: 2022-08-10 | Stop reason: HOSPADM

## 2022-08-07 RX ORDER — ACETAMINOPHEN 650 MG/1
650 SUPPOSITORY RECTAL EVERY 4 HOURS PRN
Status: DISCONTINUED | OUTPATIENT
Start: 2022-08-07 | End: 2022-08-10 | Stop reason: HOSPADM

## 2022-08-07 RX ORDER — LOSARTAN POTASSIUM 25 MG/1
25 TABLET ORAL
Status: DISCONTINUED | OUTPATIENT
Start: 2022-08-07 | End: 2022-08-08

## 2022-08-07 RX ORDER — POTASSIUM CHLORIDE 1.5 G/1.77G
40 POWDER, FOR SOLUTION ORAL AS NEEDED
Status: DISCONTINUED | OUTPATIENT
Start: 2022-08-07 | End: 2022-08-10 | Stop reason: HOSPADM

## 2022-08-07 RX ORDER — SODIUM CHLORIDE 0.9 % (FLUSH) 0.9 %
10 SYRINGE (ML) INJECTION AS NEEDED
Status: DISCONTINUED | OUTPATIENT
Start: 2022-08-07 | End: 2022-08-10 | Stop reason: HOSPADM

## 2022-08-07 RX ORDER — ACETAMINOPHEN 325 MG/1
650 TABLET ORAL EVERY 4 HOURS PRN
Status: DISCONTINUED | OUTPATIENT
Start: 2022-08-07 | End: 2022-08-10 | Stop reason: HOSPADM

## 2022-08-07 RX ORDER — POTASSIUM CHLORIDE 7.45 MG/ML
10 INJECTION INTRAVENOUS
Status: DISCONTINUED | OUTPATIENT
Start: 2022-08-07 | End: 2022-08-10 | Stop reason: HOSPADM

## 2022-08-07 RX ORDER — FUROSEMIDE 10 MG/ML
40 INJECTION INTRAMUSCULAR; INTRAVENOUS ONCE
Status: COMPLETED | OUTPATIENT
Start: 2022-08-07 | End: 2022-08-07

## 2022-08-07 RX ORDER — HYDROCODONE BITARTRATE AND ACETAMINOPHEN 5; 325 MG/1; MG/1
1 TABLET ORAL EVERY 4 HOURS PRN
Status: DISCONTINUED | OUTPATIENT
Start: 2022-08-07 | End: 2022-08-10 | Stop reason: HOSPADM

## 2022-08-07 RX ORDER — ACETAMINOPHEN 160 MG/5ML
650 SOLUTION ORAL EVERY 4 HOURS PRN
Status: DISCONTINUED | OUTPATIENT
Start: 2022-08-07 | End: 2022-08-10 | Stop reason: HOSPADM

## 2022-08-07 RX ADMIN — POTASSIUM CHLORIDE 40 MEQ: 750 TABLET, EXTENDED RELEASE ORAL at 18:08

## 2022-08-07 RX ADMIN — FUROSEMIDE 80 MG: 10 INJECTION, SOLUTION INTRAMUSCULAR; INTRAVENOUS at 22:47

## 2022-08-07 RX ADMIN — POTASSIUM CHLORIDE 40 MEQ: 750 TABLET, EXTENDED RELEASE ORAL at 12:55

## 2022-08-07 RX ADMIN — POTASSIUM CHLORIDE 40 MEQ: 750 TABLET, EXTENDED RELEASE ORAL at 08:34

## 2022-08-07 RX ADMIN — HYDROCODONE BITARTRATE AND ACETAMINOPHEN 1 TABLET: 5; 325 TABLET ORAL at 12:55

## 2022-08-07 RX ADMIN — LOSARTAN POTASSIUM 25 MG: 25 TABLET, FILM COATED ORAL at 18:08

## 2022-08-07 RX ADMIN — HYDROCODONE BITARTRATE AND ACETAMINOPHEN 1 TABLET: 5; 325 TABLET ORAL at 19:44

## 2022-08-07 RX ADMIN — ENOXAPARIN SODIUM 40 MG: 100 INJECTION SUBCUTANEOUS at 03:47

## 2022-08-07 RX ADMIN — FUROSEMIDE 40 MG: 10 INJECTION, SOLUTION INTRAMUSCULAR; INTRAVENOUS at 10:30

## 2022-08-07 RX ADMIN — Medication 10 ML: at 08:34

## 2022-08-07 RX ADMIN — Medication 10 ML: at 03:47

## 2022-08-07 RX ADMIN — POTASSIUM CHLORIDE 40 MEQ: 750 TABLET, EXTENDED RELEASE ORAL at 16:54

## 2022-08-07 RX ADMIN — Medication 10 ML: at 21:49

## 2022-08-07 NOTE — PLAN OF CARE
"Goal Outcome Evaluation:  Plan of Care Reviewed With: patient        Progress: improving  Outcome Evaluation: Pt admitted on floor this early morning. K was 3.0, replaced. IV lasix was given in ER. BLE severe edema. Pt refused bed alarm, had fall hx per pt, education provided, still refused bed alarm. Pt refused skin check as well, stated\" Nobody looked at my buttocks previously, NO!\" Education provided, charge RN notified. Daily weight, strict I&O, carido consulted. On regular heart healthy diet, room air, NSR. Needs met, will continue to monitor. Pt stated he doesn't have any home meds, admission hx done.  "
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
## 2022-08-07 NOTE — ED NOTES
Pt to ED from home via [facility]. Pt c/o of chest pain, SOB, and bilateral lower extremity swelling. Pt says this started July 3rd and was seen here for it around then. Pt has HX of CHF, hep B&C.
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
## 2022-08-08 ENCOUNTER — APPOINTMENT (OUTPATIENT)
Dept: CT IMAGING | Facility: HOSPITAL | Age: 47
End: 2022-08-08

## 2022-08-08 LAB
ALBUMIN SERPL-MCNC: 2.3 G/DL (ref 3.5–5.2)
ALBUMIN/GLOB SERPL: 0.6 G/DL
ALP SERPL-CCNC: 125 U/L (ref 39–117)
ALT SERPL W P-5'-P-CCNC: 22 U/L (ref 1–41)
ANION GAP SERPL CALCULATED.3IONS-SCNC: 5.1 MMOL/L (ref 5–15)
AST SERPL-CCNC: 44 U/L (ref 1–40)
BASOPHILS # BLD AUTO: 0.05 10*3/MM3 (ref 0–0.2)
BASOPHILS NFR BLD AUTO: 1.2 % (ref 0–1.5)
BILIRUB SERPL-MCNC: 0.3 MG/DL (ref 0–1.2)
BUN SERPL-MCNC: 9 MG/DL (ref 6–20)
BUN/CREAT SERPL: 12.9 (ref 7–25)
CALCIUM SPEC-SCNC: 8.3 MG/DL (ref 8.6–10.5)
CHLORIDE SERPL-SCNC: 100 MMOL/L (ref 98–107)
CO2 SERPL-SCNC: 34.9 MMOL/L (ref 22–29)
CREAT SERPL-MCNC: 0.7 MG/DL (ref 0.76–1.27)
CRYOCRIT SER SPUN WESTERGREN: NORMAL %
CRYOGLOB SER QL 1D COLD INC: ABNORMAL
CRYOGLOB TYP SER IFE: NORMAL
DEPRECATED RDW RBC AUTO: 56.3 FL (ref 37–54)
EGFRCR SERPLBLD CKD-EPI 2021: 115.8 ML/MIN/1.73
EOSINOPHIL # BLD AUTO: 0.2 10*3/MM3 (ref 0–0.4)
EOSINOPHIL NFR BLD AUTO: 4.6 % (ref 0.3–6.2)
ERYTHROCYTE [DISTWIDTH] IN BLOOD BY AUTOMATED COUNT: 18 % (ref 12.3–15.4)
GLOBULIN UR ELPH-MCNC: 4.1 GM/DL
GLUCOSE SERPL-MCNC: 88 MG/DL (ref 65–99)
HCT VFR BLD AUTO: 34.7 % (ref 37.5–51)
HGB BLD-MCNC: 11.3 G/DL (ref 13–17.7)
IMM GRANULOCYTES # BLD AUTO: 0.02 10*3/MM3 (ref 0–0.05)
IMM GRANULOCYTES NFR BLD AUTO: 0.5 % (ref 0–0.5)
LYMPHOCYTES # BLD AUTO: 1.11 10*3/MM3 (ref 0.7–3.1)
LYMPHOCYTES NFR BLD AUTO: 25.8 % (ref 19.6–45.3)
MCH RBC QN AUTO: 28.4 PG (ref 26.6–33)
MCHC RBC AUTO-ENTMCNC: 32.6 G/DL (ref 31.5–35.7)
MCV RBC AUTO: 87.2 FL (ref 79–97)
MONOCYTES # BLD AUTO: 0.43 10*3/MM3 (ref 0.1–0.9)
MONOCYTES NFR BLD AUTO: 10 % (ref 5–12)
NEUTROPHILS NFR BLD AUTO: 2.5 10*3/MM3 (ref 1.7–7)
NEUTROPHILS NFR BLD AUTO: 57.9 % (ref 42.7–76)
NRBC BLD AUTO-RTO: 0 /100 WBC (ref 0–0.2)
PLATELET # BLD AUTO: 148 10*3/MM3 (ref 140–450)
PMV BLD AUTO: 10 FL (ref 6–12)
POTASSIUM SERPL-SCNC: 3.8 MMOL/L (ref 3.5–5.2)
POTASSIUM SERPL-SCNC: 3.8 MMOL/L (ref 3.5–5.2)
PROT SERPL-MCNC: 6.4 G/DL (ref 6–8.5)
RBC # BLD AUTO: 3.98 10*6/MM3 (ref 4.14–5.8)
SODIUM SERPL-SCNC: 140 MMOL/L (ref 136–145)
WBC NRBC COR # BLD: 4.31 10*3/MM3 (ref 3.4–10.8)

## 2022-08-08 PROCEDURE — 99253 IP/OBS CNSLTJ NEW/EST LOW 45: CPT | Performed by: INTERNAL MEDICINE

## 2022-08-08 PROCEDURE — 74176 CT ABD & PELVIS W/O CONTRAST: CPT

## 2022-08-08 PROCEDURE — 25010000002 ENOXAPARIN PER 10 MG: Performed by: NURSE PRACTITIONER

## 2022-08-08 RX ORDER — ZOLPIDEM TARTRATE 5 MG/1
5 TABLET ORAL NIGHTLY PRN
Status: DISCONTINUED | OUTPATIENT
Start: 2022-08-08 | End: 2022-08-10 | Stop reason: HOSPADM

## 2022-08-08 RX ORDER — NICOTINE 21 MG/24HR
1 PATCH, TRANSDERMAL 24 HOURS TRANSDERMAL
Status: DISCONTINUED | OUTPATIENT
Start: 2022-08-08 | End: 2022-08-10 | Stop reason: HOSPADM

## 2022-08-08 RX ORDER — TORSEMIDE 20 MG/1
40 TABLET ORAL
Status: DISCONTINUED | OUTPATIENT
Start: 2022-08-08 | End: 2022-08-10 | Stop reason: HOSPADM

## 2022-08-08 RX ORDER — LOSARTAN POTASSIUM 100 MG/1
100 TABLET ORAL
Status: DISCONTINUED | OUTPATIENT
Start: 2022-08-09 | End: 2022-08-10 | Stop reason: HOSPADM

## 2022-08-08 RX ADMIN — HYDROCODONE BITARTRATE AND ACETAMINOPHEN 1 TABLET: 5; 325 TABLET ORAL at 06:26

## 2022-08-08 RX ADMIN — HYDROCODONE BITARTRATE AND ACETAMINOPHEN 1 TABLET: 5; 325 TABLET ORAL at 23:02

## 2022-08-08 RX ADMIN — POTASSIUM CHLORIDE 40 MEQ: 750 TABLET, EXTENDED RELEASE ORAL at 18:21

## 2022-08-08 RX ADMIN — HYDROCODONE BITARTRATE AND ACETAMINOPHEN 1 TABLET: 5; 325 TABLET ORAL at 13:29

## 2022-08-08 RX ADMIN — Medication 10 ML: at 08:45

## 2022-08-08 RX ADMIN — Medication 10 ML: at 21:02

## 2022-08-08 RX ADMIN — NICOTINE 1 PATCH: 21 PATCH, EXTENDED RELEASE TRANSDERMAL at 14:06

## 2022-08-08 RX ADMIN — TORSEMIDE 40 MG: 20 TABLET ORAL at 18:21

## 2022-08-08 RX ADMIN — LOSARTAN POTASSIUM 25 MG: 25 TABLET, FILM COATED ORAL at 08:45

## 2022-08-08 RX ADMIN — ENOXAPARIN SODIUM 40 MG: 100 INJECTION SUBCUTANEOUS at 08:45

## 2022-08-08 RX ADMIN — POTASSIUM CHLORIDE 40 MEQ: 750 TABLET, EXTENDED RELEASE ORAL at 08:45

## 2022-08-08 RX ADMIN — HYDROCODONE BITARTRATE AND ACETAMINOPHEN 1 TABLET: 5; 325 TABLET ORAL at 18:24

## 2022-08-08 RX ADMIN — TORSEMIDE 40 MG: 20 TABLET ORAL at 13:26
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
## 2022-08-08 NOTE — PLAN OF CARE
Goal Outcome Evaluation:  Plan of Care Reviewed With: patient        Progress: improving  Outcome Evaluation: VSS. K+ was 2.9 replaced K+. Recheck at 2200. IV lasix continue. Renal and cardio consulted. PRN norco. Will continue to monitor.
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
## 2022-08-08 NOTE — PROGRESS NOTES
[facility] Progress Note      Patient Name: Maged Pantoja  : 1975  MRN: [id number]  Primary Care Physician:  Provider, No Known  Date of admission: 2022    Subjective     Interval History:   Follow-up anasarca and nephrotic syndrome with membranous GN    Patient came back to the hospital with worsening edema, he had a kidney biopsy over a week ago the pathology revealed membranous GN consistent with secondary form of membranous GN related to hepatitis C viral infection.  Patient also has cirrhosis of the liver    He denies any chest pain or shortness of air, no orthopnea or PND, no nausea or vomiting, he had significant edema.    Review of Systems:   As noted above    Objective     Vitals:   Temp:  [98.2 °F (36.8 °C)-98.5 °F (36.9 °C)] 98.3 °F (36.8 °C)  Heart Rate:  [83-87] 85  Resp:  [16-18] 16  BP: (109-123)/(68-91) 109/68    Intake/Output Summary (Last 24 hours) at 2022 1037  Last data filed at 2022 0600  Gross per 24 hour   Intake 1920 ml   Output 1700 ml   Net 220 ml       Physical Exam:    General Appearance: alert, oriented x 3, no acute distress   Skin: warm and dry, numerous tattoos  HEENT: oral mucosa normal, nonicteric sclera  Neck: supple, no JVD  Lungs: CTA, unlabored breathing effort  Heart: RRR, normal S1 and S2, no S3, no  Abdomen: soft, nontender, nondistended, normoactive  : no palpable bladder  Extremities: 4+ lower extremity with hip and thigh edema  Neuro: normal speech and mental status     Scheduled Meds:     enoxaparin, 40 mg, Subcutaneous, Daily  furosemide, 80 mg, Intravenous, Q12H  losartan, 25 mg, Oral, Q24H  potassium chloride, 40 mEq, Oral, BID With Meals  sodium chloride, 10 mL, Intravenous, Q12H      IV Meds:        Results Reviewed:   I have personally reviewed the results from the time of this admission to 2022 10:37 EDT     Results from last 7 days   Lab Units 22  2347 22  0615 22  2242   SODIUM mmol/L 140  140 142   POTASSIUM mmol/L 3.8  3.8 2.9* 3.0*   CHLORIDE mmol/L 100 102 103   CO2 mmol/L 34.9* 31.0* 31.2*   BUN mg/dL 9 9 9   CREATININE mg/dL 0.70* 0.64* 0.70*   CALCIUM mg/dL 8.3* 7.4* 7.9*   BILIRUBIN mg/dL 0.3  --  0.2   ALK PHOS U/L 125*  --  121*   ALT (SGPT) U/L 22  --  21   AST (SGOT) U/L 44*  --  46*   GLUCOSE mg/dL 88 110* 115*       Estimated Creatinine Clearance: 185.1 mL/min (A) (by C-G formula based on SCr of 0.7 mg/dL (L)).    Results from last 7 days   Lab Units 08/07/22  0615 08/06/22  2242   MAGNESIUM mg/dL 1.9 1.7             Results from last 7 days   Lab Units 08/07/22  2347 08/07/22  0615 08/06/22  2242   WBC 10*3/mm3 4.31 3.91 5.40   HEMOGLOBIN g/dL 11.3* 9.5* 10.5*   PLATELETS 10*3/mm3 148 129* 157       Results from last 7 days   Lab Units 08/07/22  0615   INR  1.15*       Assessment / Plan     ASSESSMENT:  1.  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  Anasarca associated with hypoalbuminemia and nephrotic syndrome  3.  Hepatitis C infection with cirrhosis of the liver with ascites  4.  History of chronic systolic dysfunction  5.  Prior history of substance abuse    PLAN:  -The patient needs treatment for his hepatitis C infection  My suggestion is Mavyret, I will ask GI/pathology to evaluate for the initiation of this drug hoping that treatment of the hep C will help treating his nephrotic syndrome and membranous GN.  -Change diuretics to torsemide and increase losartan to 100 mg daily  -Surveillance labs    I had a long discussion with the patient and he voiced good understanding  Thank you for involving us in the care of Maged Pantoja.  Please feel free to call with any questions.    Nicholas Mario MD  08/08/22  10:37 EDT    [facility]  [phone]      Much of this encounter note is an  electronic transcription/translation of spoken language to printed text. The electronic translation of spoken language may permit erroneous, or at times, nonsensical words or phrases to be inadvertently transcribed; Although I have reviewed the note for such errors, some may still exist.

## 2022-08-08 NOTE — PLAN OF CARE
Goal Outcome Evaluation:  Plan of Care Reviewed With: patient           Outcome Evaluation: K+ level better after replacement. BLE still swollen. Diuretics given. VSS.

## 2022-08-08 NOTE — PLAN OF CARE
Goal Outcome Evaluation:  Plan of Care Reviewed With: patient        Progress: no change  Outcome Evaluation: No major issues over shift, VS stable, pt remains on room air, VS stable, pt's legs still very edematous, pt switched to oral diuretics today, monitoring response, pt also had CT scan to check for kidneys stones, awaiting results

## 2022-08-09 ENCOUNTER — APPOINTMENT (OUTPATIENT)
Dept: GENERAL RADIOLOGY | Facility: HOSPITAL | Age: 47
End: 2022-08-09

## 2022-08-09 ENCOUNTER — APPOINTMENT (OUTPATIENT)
Dept: ULTRASOUND IMAGING | Facility: HOSPITAL | Age: 47
End: 2022-08-09

## 2022-08-09 LAB
ALBUMIN FLD-MCNC: 0.3 G/DL
ALBUMIN SERPL-MCNC: 1.9 G/DL (ref 3.5–5.2)
ALBUMIN/GLOB SERPL: 0.5 G/DL
ALP SERPL-CCNC: 108 U/L (ref 39–117)
ALT SERPL W P-5'-P-CCNC: 19 U/L (ref 1–41)
ANION GAP SERPL CALCULATED.3IONS-SCNC: 4.8 MMOL/L (ref 5–15)
APPEARANCE FLD: CLEAR
AST SERPL-CCNC: 39 U/L (ref 1–40)
BILIRUB SERPL-MCNC: 0.3 MG/DL (ref 0–1.2)
BUN SERPL-MCNC: 9 MG/DL (ref 6–20)
BUN/CREAT SERPL: 13.8 (ref 7–25)
CALCIUM SPEC-SCNC: 7.8 MG/DL (ref 8.6–10.5)
CHLORIDE SERPL-SCNC: 98 MMOL/L (ref 98–107)
CO2 SERPL-SCNC: 29.2 MMOL/L (ref 22–29)
COLOR FLD: YELLOW
CREAT SERPL-MCNC: 0.65 MG/DL (ref 0.76–1.27)
DEPRECATED RDW RBC AUTO: 57 FL (ref 37–54)
EGFRCR SERPLBLD CKD-EPI 2021: 118.4 ML/MIN/1.73
ERYTHROCYTE [DISTWIDTH] IN BLOOD BY AUTOMATED COUNT: 18 % (ref 12.3–15.4)
GLOBULIN UR ELPH-MCNC: 3.5 GM/DL
GLUCOSE SERPL-MCNC: 89 MG/DL (ref 65–99)
HBV SURFACE AB SER RIA-ACNC: NORMAL
HCT VFR BLD AUTO: 29.2 % (ref 37.5–51)
HGB BLD-MCNC: 9.6 G/DL (ref 13–17.7)
LYMPHOCYTES NFR FLD MANUAL: 79 %
MAGNESIUM SERPL-MCNC: 1.4 MG/DL (ref 1.6–2.6)
MCH RBC QN AUTO: 28.8 PG (ref 26.6–33)
MCHC RBC AUTO-ENTMCNC: 32.9 G/DL (ref 31.5–35.7)
MCV RBC AUTO: 87.7 FL (ref 79–97)
METHOD: NORMAL
MONOS+MACROS NFR FLD: 15 %
NEUTROPHILS NFR FLD MANUAL: 6 %
NUC CELL # FLD: 374 /MM3
PHOSPHATE SERPL-MCNC: 2.5 MG/DL (ref 2.5–4.5)
PHOSPHATE SERPL-MCNC: 2.9 MG/DL (ref 2.5–4.5)
PLATELET # BLD AUTO: 123 10*3/MM3 (ref 140–450)
PMV BLD AUTO: 10 FL (ref 6–12)
POTASSIUM SERPL-SCNC: 4 MMOL/L (ref 3.5–5.2)
PROT FLD-MCNC: <1 G/DL
PROT SERPL-MCNC: 5.4 G/DL (ref 6–8.5)
RBC # BLD AUTO: 3.33 10*6/MM3 (ref 4.14–5.8)
RBC # FLD AUTO: 83 /MM3
SODIUM SERPL-SCNC: 132 MMOL/L (ref 136–145)
URATE SERPL-MCNC: 4.9 MG/DL (ref 3.4–7)
WBC NRBC COR # BLD: 3.51 10*3/MM3 (ref 3.4–10.8)

## 2022-08-09 PROCEDURE — 87350 HEPATITIS BE AG IA: CPT | Performed by: INTERNAL MEDICINE

## 2022-08-09 PROCEDURE — 83735 ASSAY OF MAGNESIUM: CPT | Performed by: INTERNAL MEDICINE

## 2022-08-09 PROCEDURE — 74018 RADEX ABDOMEN 1 VIEW: CPT

## 2022-08-09 PROCEDURE — 99232 SBSQ HOSP IP/OBS MODERATE 35: CPT | Performed by: INTERNAL MEDICINE

## 2022-08-09 PROCEDURE — 88112 CYTOPATH CELL ENHANCE TECH: CPT | Performed by: STUDENT IN AN ORGANIZED HEALTH CARE EDUCATION/TRAINING PROGRAM

## 2022-08-09 PROCEDURE — 84157 ASSAY OF PROTEIN OTHER: CPT | Performed by: STUDENT IN AN ORGANIZED HEALTH CARE EDUCATION/TRAINING PROGRAM

## 2022-08-09 PROCEDURE — 86704 HEP B CORE ANTIBODY TOTAL: CPT | Performed by: INTERNAL MEDICINE

## 2022-08-09 PROCEDURE — 82042 OTHER SOURCE ALBUMIN QUAN EA: CPT | Performed by: STUDENT IN AN ORGANIZED HEALTH CARE EDUCATION/TRAINING PROGRAM

## 2022-08-09 PROCEDURE — 87070 CULTURE OTHR SPECIMN AEROBIC: CPT | Performed by: STUDENT IN AN ORGANIZED HEALTH CARE EDUCATION/TRAINING PROGRAM

## 2022-08-09 PROCEDURE — 76942 ECHO GUIDE FOR BIOPSY: CPT

## 2022-08-09 PROCEDURE — 84100 ASSAY OF PHOSPHORUS: CPT | Performed by: INTERNAL MEDICINE

## 2022-08-09 PROCEDURE — 89051 BODY FLUID CELL COUNT: CPT | Performed by: STUDENT IN AN ORGANIZED HEALTH CARE EDUCATION/TRAINING PROGRAM

## 2022-08-09 PROCEDURE — 88305 TISSUE EXAM BY PATHOLOGIST: CPT | Performed by: STUDENT IN AN ORGANIZED HEALTH CARE EDUCATION/TRAINING PROGRAM

## 2022-08-09 PROCEDURE — 0W9G3ZX DRAINAGE OF PERITONEAL CAVITY, PERCUTANEOUS APPROACH, DIAGNOSTIC: ICD-10-PCS | Performed by: STUDENT IN AN ORGANIZED HEALTH CARE EDUCATION/TRAINING PROGRAM

## 2022-08-09 PROCEDURE — 88185 FLOWCYTOMETRY/TC ADD-ON: CPT

## 2022-08-09 PROCEDURE — 88184 FLOWCYTOMETRY/ TC 1 MARKER: CPT

## 2022-08-09 PROCEDURE — 25010000002 ENOXAPARIN PER 10 MG: Performed by: NURSE PRACTITIONER

## 2022-08-09 PROCEDURE — 86707 HEPATITIS BE ANTIBODY: CPT | Performed by: INTERNAL MEDICINE

## 2022-08-09 PROCEDURE — 85027 COMPLETE CBC AUTOMATED: CPT | Performed by: HOSPITALIST

## 2022-08-09 PROCEDURE — 87205 SMEAR GRAM STAIN: CPT | Performed by: STUDENT IN AN ORGANIZED HEALTH CARE EDUCATION/TRAINING PROGRAM

## 2022-08-09 PROCEDURE — 86706 HEP B SURFACE ANTIBODY: CPT | Performed by: INTERNAL MEDICINE

## 2022-08-09 PROCEDURE — 0 LIDOCAINE 1 % SOLUTION: Performed by: RADIOLOGY

## 2022-08-09 PROCEDURE — 87075 CULTR BACTERIA EXCEPT BLOOD: CPT | Performed by: STUDENT IN AN ORGANIZED HEALTH CARE EDUCATION/TRAINING PROGRAM

## 2022-08-09 PROCEDURE — 84550 ASSAY OF BLOOD/URIC ACID: CPT | Performed by: INTERNAL MEDICINE

## 2022-08-09 PROCEDURE — 80053 COMPREHEN METABOLIC PANEL: CPT | Performed by: INTERNAL MEDICINE

## 2022-08-09 PROCEDURE — 84100 ASSAY OF PHOSPHORUS: CPT | Performed by: STUDENT IN AN ORGANIZED HEALTH CARE EDUCATION/TRAINING PROGRAM

## 2022-08-09 RX ORDER — LIDOCAINE HYDROCHLORIDE 10 MG/ML
10 INJECTION, SOLUTION INFILTRATION; PERINEURAL ONCE
Status: COMPLETED | OUTPATIENT
Start: 2022-08-09 | End: 2022-08-09

## 2022-08-09 RX ADMIN — HYDROCODONE BITARTRATE AND ACETAMINOPHEN 1 TABLET: 5; 325 TABLET ORAL at 03:01

## 2022-08-09 RX ADMIN — NICOTINE 1 PATCH: 21 PATCH, EXTENDED RELEASE TRANSDERMAL at 09:18

## 2022-08-09 RX ADMIN — ENOXAPARIN SODIUM 40 MG: 100 INJECTION SUBCUTANEOUS at 09:17

## 2022-08-09 RX ADMIN — HYDROCODONE BITARTRATE AND ACETAMINOPHEN 1 TABLET: 5; 325 TABLET ORAL at 09:17

## 2022-08-09 RX ADMIN — LIDOCAINE HYDROCHLORIDE 9 ML: 10 INJECTION, SOLUTION INFILTRATION; PERINEURAL at 15:23

## 2022-08-09 RX ADMIN — LOSARTAN POTASSIUM 100 MG: 100 TABLET, FILM COATED ORAL at 09:17

## 2022-08-09 RX ADMIN — HYDROCODONE BITARTRATE AND ACETAMINOPHEN 1 TABLET: 5; 325 TABLET ORAL at 14:31

## 2022-08-09 RX ADMIN — TORSEMIDE 40 MG: 20 TABLET ORAL at 17:43

## 2022-08-09 RX ADMIN — Medication 10 ML: at 09:17

## 2022-08-09 RX ADMIN — Medication 10 ML: at 22:02

## 2022-08-09 RX ADMIN — POTASSIUM CHLORIDE 40 MEQ: 750 TABLET, EXTENDED RELEASE ORAL at 17:43

## 2022-08-09 RX ADMIN — TORSEMIDE 40 MG: 20 TABLET ORAL at 09:17

## 2022-08-09 RX ADMIN — HYDROCODONE BITARTRATE AND ACETAMINOPHEN 1 TABLET: 5; 325 TABLET ORAL at 21:57

## 2022-08-09 RX ADMIN — POTASSIUM CHLORIDE 40 MEQ: 750 TABLET, EXTENDED RELEASE ORAL at 09:17

## 2022-08-09 NOTE — PLAN OF CARE
Problem: Adult Inpatient Plan of Care  Goal: Plan of Care Review  Outcome: Ongoing, Progressing  Flowsheets (Taken 8/9/2022 1615)  Progress: improving  Plan of Care Reviewed With: patient  Outcome Evaluation: Vitals stable. Paracentesis done today: 2.2 L removed. Pain controlled with prn Norco. +ambulation. Will continue to monitor.

## 2022-08-09 NOTE — NURSING NOTE
Security came to floor upon request. Patient started to become agitated pacing verbalizing profane language against me. Patient initally c/o pain BLE . Prn pain med was administered per patient request. Then c/o abdominal pain. Assessment done. Assured patient will notify MD. Patient holds on to the Hydrocodone 5/325mg but doesn't want to take it. Explained to  the patient that if he doesn't want to take the Hydrocodone that I could just take it back and waste it. He gets irritated but eventually swallowed the med. Started to become hostile. Notified   Pedro BRUCE / Charge RN.

## 2022-08-09 NOTE — PLAN OF CARE
Goal Outcome Evaluation:  Plan of Care Reviewed With: patient           Outcome Evaluation: C/o abdominal pain. XR Imaging ordered. Nothing acute. Constantly closing doors despite pt teaching. Security reminded patient.

## 2022-08-09 NOTE — PROGRESS NOTES
Name: Maged Pantoja ADMIT: 2022   : 1975  PCP: Provider, No Known    MRN: [id number] LOS: 2 days   AGE/SEX: 47 y.o. male  ROOM: [facility]/     Subjective   Subjective   This is a 47-year-old male with anasarca and nephrotic syndrome due to membranous glomerulonephritis.  He was also found to have hepatitis B and C, but because of the negative viral loads treatment would not be helpful.  The glomerulonephritis was thought to be most likely secondary to the hepatitis C infection.  He is currently being treated with angiotensin receptor blockers and diuretics.  He may be a candidate for immunosuppressive therapy.  Currently on losartan and torsemide.  He was seen by GI and paracentesis was recommended.  He was also complaining of abdominal pain overnight.  A KUB was ordered and was okay.  He is able to tolerate diet today.    Review of Systems   Constitutional: Negative for chills and fever.   Respiratory: Positive for shortness of breath. Negative for chest tightness.    Cardiovascular: Positive for leg swelling. Negative for chest pain and palpitations.   Gastrointestinal: Positive for abdominal distention. Negative for abdominal pain.   Neurological: Negative for dizziness and light-headedness.        Objective   Objective   Vital Signs  Temp:  [98.2 °F (36.8 °C)-98.8 °F (37.1 °C)] 98.2 °F (36.8 °C)  Heart Rate:  [76-86] 80  Resp:  [18-20] 18  BP: (113-137)/(77-98) 116/77  SpO2:  [92 %-99 %] 95 %  on   ;   Device (Oxygen Therapy): room air  Body mass index is 38.21 kg/m².  Physical Exam  General: Well nourished, no acute distress  HEENT: Normocephalic and atraumatic  CV: Regular rate and rhythm, no murmurs  Lungs: Clear to auscultation bilaterally  Abdomen: Distended, nontender to palpation.  Active bowel sounds  Extremities: 3-4+ pitting edema bilaterally appreciated.  Neuro: Oriented x3, no focal neurological deficit appreciated.    Results Review     I reviewed the patient's new clinical  results.  Results from last 7 days   Lab Units 08/09/22 0457 08/07/22 2347 08/07/22 0615 08/06/22  2242   WBC 10*3/mm3 3.51 4.31 3.91 5.40   HEMOGLOBIN g/dL 9.6* 11.3* 9.5* 10.5*   PLATELETS 10*3/mm3 123* 148 129* 157     Results from last 7 days   Lab Units 08/09/22 0457 08/07/22 2347 08/07/22 0615 08/06/22  2242   SODIUM mmol/L 132* 140 140 142   POTASSIUM mmol/L 4.0 3.8  3.8 2.9* 3.0*   CHLORIDE mmol/L 98 100 102 103   CO2 mmol/L 29.2* 34.9* 31.0* 31.2*   BUN mg/dL 9 9 9 9   CREATININE mg/dL 0.65* 0.70* 0.64* 0.70*   GLUCOSE mg/dL 89 88 110* 115*   Estimated Creatinine Clearance: 198.5 mL/min (A) (by C-G formula based on SCr of 0.65 mg/dL (L)).  Results from last 7 days   Lab Units 08/09/22 0457 08/07/22 2347 08/06/22  2242   ALBUMIN g/dL 1.90* 2.30* 2.10*   BILIRUBIN mg/dL 0.3 0.3 0.2   ALK PHOS U/L 108 125* 121*   AST (SGOT) U/L 39 44* 46*   ALT (SGPT) U/L 19 22 21     Results from last 7 days   Lab Units 08/09/22 0457 08/07/22 2347 08/07/22 0615 08/06/22  2242   CALCIUM mg/dL 7.8* 8.3* 7.4* 7.9*   ALBUMIN g/dL 1.90* 2.30*  --  2.10*   MAGNESIUM mg/dL 1.4*  --  1.9 1.7   PHOSPHORUS mg/dL 2.5  --   --   --        COVID19   Date Value Ref Range Status   08/07/2022 Not Detected Not Detected - Ref. Range Final   07/22/2022 Not Detected Not Detected - Ref. Range Final     No results found for: HGBA1C, POCGLU    XR Abdomen KUB  Narrative: KUB     HISTORY: Abdominal pain     COMPARISON: 08/08/2022     FINDINGS:  No free air seen beneath the diaphragm. Visualized bowel gas pattern is  nonobstructive.     Impression: Nonobstructive bowel gas pattern.     This report was finalized on 8/9/2022 2:04 AM by Dr. Emelyn Matthews M.D.       Scheduled Medications  enoxaparin, 40 mg, Subcutaneous, Daily  losartan, 100 mg, Oral, Q24H  nicotine, 1 patch, Transdermal, Q24H  potassium chloride, 40 mEq, Oral, BID With Meals  sodium chloride, 10 mL, Intravenous, Q12H  torsemide, 40 mg, Oral, BID    Infusions   Diet  Diet  Regular; Cardiac       Assessment/Plan     Active Hospital Problems    Diagnosis  POA   • **Anasarca [R60.1]  Yes   • 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       47 y.o. male admitted with Anasarca.    Anasarca  Membranous glomerulonephritis  Nephrotic syndrome  Hypoalbuminemia  Nephrology seeing, GN is thought to be secondary to his hepatitis C.  Currently on losartan and torsemide which he will remain on      Cirrhosis of the liver with ascites  Has hep B and hep C infections.  Hepatitis C viral loads are unremarkable  Paracentesis with fluid studies ordered     Substance abuse  Tobacco abuse      · Lovenox 40 mg SC daily for DVT prophylaxis.  · Full code.  · Discussed with patient and nursing staff.        Gautam Sutton MD  [facility]  08/09/22  10:20 EDT     I wore protective equipment throughout this patient encounter including a face mask, gloves and protective eyewear.  Hand hygiene was performed before donning protective equipment and after removal when leaving the room.
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
## 2022-08-09 NOTE — CONSULTS
Patient politely declined full evaluation. States he will follow up with [facility] for drug treatment. Patient is future oriented talking about his new puppy and wants to be involved in his 6yo daughter's life. Patient talked at length re his full body swelling and medical issues.     Access will sign off.

## 2022-08-09 NOTE — PROGRESS NOTES
[facility] Progress Note      Patient Name: Maged Pantoja  : 1975  MRN: [id number]  Primary Care Physician:  Provider, No Known  Date of admission: 2022    Subjective     Interval History:   Follow-up anasarca and nephrotic syndrome with membranous GN    Patient's complaint of increasing edema, he was evaluated by GI since it was felt he had negative viral loads for hepatitis B and C treatment would not be helpful.    He denies any chest pain or shortness of air, no orthopnea or PND, no nausea or vomiting, he had significant edema.    Review of Systems:   As noted above    Objective     Vitals:   Temp:  [98.2 °F (36.8 °C)-98.8 °F (37.1 °C)] 98.2 °F (36.8 °C)  Heart Rate:  [76-86] 80  Resp:  [18-20] 18  BP: (113-137)/(77-98) 116/77    Intake/Output Summary (Last 24 hours) at 2022 0928  Last data filed at 2022 0552  Gross per 24 hour   Intake 240 ml   Output 1275 ml   Net -1035 ml       Physical Exam:    General Appearance: alert, oriented x 3, no acute distress   Skin: warm and dry, numerous tattoos  HEENT: oral mucosa normal, nonicteric sclera  Neck: supple, no JVD  Lungs: CTA, unlabored breathing effort  Heart: RRR, normal S1 and S2, no S3, no  Abdomen: soft, nontender, nondistended, normoactive  : no palpable bladder  Extremities: 4+ lower extremity with hip and thigh edema  Neuro: normal speech and mental status     Scheduled Meds:     enoxaparin, 40 mg, Subcutaneous, Daily  losartan, 100 mg, Oral, Q24H  nicotine, 1 patch, Transdermal, Q24H  potassium chloride, 40 mEq, Oral, BID With Meals  sodium chloride, 10 mL, Intravenous, Q12H  torsemide, 40 mg, Oral, BID      IV Meds:        Results Reviewed:   I have personally reviewed the results from the time of this admission to 2022 09:28 EDT     Results from last 7 days   Lab Units 22  0457 22  2347 22  0615 22  2242   SODIUM mmol/L 132* 140 140 142   POTASSIUM mmol/L 4.0 3.8  3.8 2.9*  3.0*   CHLORIDE mmol/L 98 100 102 103   CO2 mmol/L 29.2* 34.9* 31.0* 31.2*   BUN mg/dL 9 9 9 9   CREATININE mg/dL 0.65* 0.70* 0.64* 0.70*   CALCIUM mg/dL 7.8* 8.3* 7.4* 7.9*   BILIRUBIN mg/dL 0.3 0.3  --  0.2   ALK PHOS U/L 108 125*  --  121*   ALT (SGPT) U/L 19 22  --  21   AST (SGOT) U/L 39 44*  --  46*   GLUCOSE mg/dL 89 88 110* 115*       Estimated Creatinine Clearance: 198.5 mL/min (A) (by C-G formula based on SCr of 0.65 mg/dL (L)).    Results from last 7 days   Lab Units 08/09/22  0457 08/07/22  0615 08/06/22  2242   MAGNESIUM mg/dL 1.4* 1.9 1.7   PHOSPHORUS mg/dL 2.5  --   --        Results from last 7 days   Lab Units 08/09/22  0457   URIC ACID mg/dL 4.9       Results from last 7 days   Lab Units 08/09/22  0457 08/07/22  2347 08/07/22  0615 08/06/22  2242   WBC 10*3/mm3 3.51 4.31 3.91 5.40   HEMOGLOBIN g/dL 9.6* 11.3* 9.5* 10.5*   PLATELETS 10*3/mm3 123* 148 129* 157       Results from last 7 days   Lab Units 08/07/22  0615   INR  1.15*       Assessment / Plan     ASSESSMENT:  1.  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  We will treat currently with angiotensin receptor blockers and diuretics and will reevaluate in the next few weeks and may consider immunosuppressive therapy at that point but I will definitely be checking his viral studies again before implementing such aggressive regimen.  2.  Anasarca associated with hypoalbuminemia and nephrotic syndrome  3.  Hepatitis C infection with cirrhosis of the liver with ascites  4.  History of chronic systolic dysfunction  5.  Prior history of substance abuse    PLAN:  -Continue ARB and diuretic  -Surveillance labs    I had a long discussion with the patient and he voiced good understanding  Thank you  for involving us in the care of Maged Pantoja.  Please feel free to call with any questions.    Nicholas Mario MD  08/09/22  09:28 EDT    [facility]  [phone]      Much of this encounter note is an electronic transcription/translation of spoken language to printed text. The electronic translation of spoken language may permit erroneous, or at times, nonsensical words or phrases to be inadvertently transcribed; Although I have reviewed the note for such errors, some may still exist.

## 2022-08-10 ENCOUNTER — READMISSION MANAGEMENT (OUTPATIENT)
Dept: CALL CENTER | Facility: HOSPITAL | Age: 47
End: 2022-08-10

## 2022-08-10 VITALS
RESPIRATION RATE: 18 BRPM | HEIGHT: 72 IN | WEIGHT: 281 LBS | HEART RATE: 82 BPM | BODY MASS INDEX: 38.06 KG/M2 | DIASTOLIC BLOOD PRESSURE: 73 MMHG | TEMPERATURE: 98.2 F | SYSTOLIC BLOOD PRESSURE: 113 MMHG | OXYGEN SATURATION: 95 %

## 2022-08-10 LAB
ALBUMIN SERPL-MCNC: 2.1 G/DL (ref 3.5–5.2)
ANION GAP SERPL CALCULATED.3IONS-SCNC: 5 MMOL/L (ref 5–15)
BUN SERPL-MCNC: 8 MG/DL (ref 6–20)
BUN/CREAT SERPL: 12.5 (ref 7–25)
CALCIUM SPEC-SCNC: 8.1 MG/DL (ref 8.6–10.5)
CHLORIDE SERPL-SCNC: 101 MMOL/L (ref 98–107)
CO2 SERPL-SCNC: 28 MMOL/L (ref 22–29)
CREAT SERPL-MCNC: 0.64 MG/DL (ref 0.76–1.27)
DEPRECATED RDW RBC AUTO: 55.9 FL (ref 37–54)
EGFRCR SERPLBLD CKD-EPI 2021: 119 ML/MIN/1.73
ERYTHROCYTE [DISTWIDTH] IN BLOOD BY AUTOMATED COUNT: 17.7 % (ref 12.3–15.4)
GLUCOSE SERPL-MCNC: 90 MG/DL (ref 65–99)
HBV CORE AB SERPL QL IA: POSITIVE
HBV E AB SERPL QL IA: NEGATIVE
HBV E AG SERPL QL IA: POSITIVE
HCT VFR BLD AUTO: 30.1 % (ref 37.5–51)
HGB BLD-MCNC: 9.9 G/DL (ref 13–17.7)
MAGNESIUM SERPL-MCNC: 1.7 MG/DL (ref 1.6–2.6)
MCH RBC QN AUTO: 28.7 PG (ref 26.6–33)
MCHC RBC AUTO-ENTMCNC: 32.9 G/DL (ref 31.5–35.7)
MCV RBC AUTO: 87.2 FL (ref 79–97)
PHOSPHATE SERPL-MCNC: 2.8 MG/DL (ref 2.5–4.5)
PLATELET # BLD AUTO: 115 10*3/MM3 (ref 140–450)
PMV BLD AUTO: 10.4 FL (ref 6–12)
POTASSIUM SERPL-SCNC: 4.5 MMOL/L (ref 3.5–5.2)
RBC # BLD AUTO: 3.45 10*6/MM3 (ref 4.14–5.8)
SODIUM SERPL-SCNC: 134 MMOL/L (ref 136–145)
URATE SERPL-MCNC: 4.5 MG/DL (ref 3.4–7)
WBC NRBC COR # BLD: 2.98 10*3/MM3 (ref 3.4–10.8)

## 2022-08-10 PROCEDURE — 85027 COMPLETE CBC AUTOMATED: CPT | Performed by: STUDENT IN AN ORGANIZED HEALTH CARE EDUCATION/TRAINING PROGRAM

## 2022-08-10 PROCEDURE — 83735 ASSAY OF MAGNESIUM: CPT | Performed by: INTERNAL MEDICINE

## 2022-08-10 PROCEDURE — 84550 ASSAY OF BLOOD/URIC ACID: CPT | Performed by: INTERNAL MEDICINE

## 2022-08-10 PROCEDURE — 80069 RENAL FUNCTION PANEL: CPT | Performed by: INTERNAL MEDICINE

## 2022-08-10 RX ORDER — LOSARTAN POTASSIUM 100 MG/1
100 TABLET ORAL
Qty: 30 TABLET | Refills: 0 | Status: SHIPPED | OUTPATIENT
Start: 2022-08-11

## 2022-08-10 RX ORDER — TORSEMIDE 20 MG/1
40 TABLET ORAL EVERY 12 HOURS
Qty: 240 TABLET | Refills: 0 | Status: SHIPPED | OUTPATIENT
Start: 2022-08-10

## 2022-08-10 RX ORDER — NICOTINE 21 MG/24HR
1 PATCH, TRANSDERMAL 24 HOURS TRANSDERMAL
Qty: 14 EACH | Refills: 0 | Status: SHIPPED | OUTPATIENT
Start: 2022-08-11

## 2022-08-10 RX ADMIN — POTASSIUM CHLORIDE 40 MEQ: 750 TABLET, EXTENDED RELEASE ORAL at 09:13

## 2022-08-10 RX ADMIN — TORSEMIDE 40 MG: 20 TABLET ORAL at 09:13

## 2022-08-10 RX ADMIN — LOSARTAN POTASSIUM 100 MG: 100 TABLET, FILM COATED ORAL at 09:13

## 2022-08-10 RX ADMIN — Medication 10 ML: at 09:13

## 2022-08-10 RX ADMIN — HYDROCODONE BITARTRATE AND ACETAMINOPHEN 1 TABLET: 5; 325 TABLET ORAL at 09:13

## 2022-08-10 RX ADMIN — NICOTINE 1 PATCH: 21 PATCH, EXTENDED RELEASE TRANSDERMAL at 09:14

## 2022-08-10 RX ADMIN — ZOLPIDEM TARTRATE 5 MG: 5 TABLET, FILM COATED ORAL at 03:05

## 2022-08-10 NOTE — PLAN OF CARE
Problem: Adult Inpatient Plan of Care  Goal: Plan of Care Review  Outcome: Met  Flowsheets (Taken 8/10/2022 1044)  Progress: improving  Plan of Care Reviewed With: patient  Outcome Evaluation: Vitals stable. Pt discharged to home with Rx, instructions, and follow ups.

## 2022-08-10 NOTE — CASE MANAGEMENT/SOCIAL WORK
Continued Stay Note  [facility]     Patient Name: Maged Pantoja  MRN: [id number]  Today's Date: 8/10/2022    Admit Date: 8/6/2022     Discharge Plan     Row Name 08/10/22 1126       Plan    Plan Comments I spoke with Dion at [employer] [phone], he said for dr's appointment on Monday since pt is ambulatory they require pt to use [facility], he said he will mail transportaton voucher for Monday to pt's home, he said it will go out in the mail today, her transferred me to [facility] to arrange transportation home, she said [facility] will be at [facility] front door in 30 minutes, pt's nurse said pt should be waiting at the front door, I spoke with pt by calling his cell phone I relayed to him the transportation arrangements and he will need to use the [employer] bus on Monday to get to his dr's appointment.   Jeniffer Weeks RN    Final Discharge Disposition Code 01 - home or self-care    Row Name 08/10/22 1037       Plan    Plan Home, denies needs other than transportation home    Patient/Family in Agreement with Plan unable to assess    Provided Post Acute Provider List? Yes    Post Acute Provider List Nursing Home;Home Health    Delivered To Patient    Method of Delivery In person    Plan Comments I spoke with pt at bedside, he confirmed the address and pharmacy are correct.  He said he does not have a PCP and was agreeable for me to get him one, after obtaining a PCP I provided him an appointment reminder with address and phone number for new PCP: Juan Leigh Monday, August 15 @ 10:00AM, after checking to see if he has Medicaid transportation benefits I gave him the name and number of [employer] [phone] who can help him with Medical transportation,  I advised he must make transportation arrangements at least a day in advance. Pt said he lives with a roommate in a multi level home with 5 steps to enter, he said his new pup is all he has to care for, pt declined to  add his roommate as a , he first said his roommate would give him a ride home then later ask for a cab voucher to get home, I advised we can arrange transportation home with [employer]. Pt said he has used home health in the past but could not recall the agency, he said he does not need home health but wanted a list of them anyway (provided).  Pt denies discharge concerns other than transportation.  Jeniffer Weeks RN               Discharge Codes    No documentation.               Expected Discharge Date and Time     Expected Discharge Date Expected Discharge Time    Aug 10, 2022             Jeniffer Weeks, RN

## 2022-08-10 NOTE — PLAN OF CARE
Goal Outcome Evaluation:  Plan of Care Reviewed With: patient        Progress: improving  Outcome Evaluation: Vitals stable. IADL. Poor sleep tonight - Ambien provided. Reports feeling better since paracentesis. Fall precautions in place.

## 2022-08-10 NOTE — PAYOR COMM NOTE
"Maged Rome (47 y.o. Male)     DC SUMMARY FOR [id number]                Date of Birth   1975    Social Security Number       Address   [address] [facility] [address]    Home Phone   [phone]    MRN   [id number]       Protestant   None    Marital Status                               Admission Date   22    Admission Type   Emergency    Admitting Provider   Henry Diaz MD    Attending Provider       Department, Room/Bed   [address], [address]/1       Discharge Date   8/10/2022    Discharge Disposition   Home or Self Care    Discharge Destination                               Attending Provider: (none)   Allergies: No Known Allergies    Isolation: None   Infection: None   Code Status: CPR   Advance Care Planning Activity    Ht: 182.9 cm (72\")   Wt: 127 kg (281 lb)    Admission Cmt: None   Principal Problem: Anasarca [R60.1]                 Active Insurance as of 2022     Primary Coverage     Payor Plan Insurance Group Employer/Plan Group    [facility] BY [facility] [id number]     Payor Plan Address Payor Plan Phone Number Payor Plan Fax Number Effective Dates    [address] [address]   2022 - None Entered    [facility] [phone]       Subscriber Name Subscriber Birth Date Member ID       MAGED ROME 1975 [id number]                 Emergency Contacts      (Rel.) Home Phone Work Phone Mobile Phone    Lora Dubose (Sister) -- -- [phone]               Discharge Summary      Gautam Sutton MD at 08/10/22 1007              Patient Name: Maged Rome  : 1975  MRN: [id number]    Date of Admission: 2022  Date of Discharge:  8/10/2022  Primary Care Physician: Juan Leigh APRN      Chief Complaint:   Chest Pain      Discharge Diagnoses     Active Hospital Problems    Diagnosis  POA   • **Anasarca [R60.1]  Yes   • 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        Hospital Course     This is a 47-year-old male with a past medical history of cirrhosis complicated by cervicitis, substance abuse, recently diagnosed membranous glomerulonephritis who came to the hospital with edema.  GI was consulted along with nephrology and cardiology.  Ultimately it was thought that his nephrotic syndrome anasarca was secondary to membranous glomerulonephritis that was due to his hepatitis B and C infections.  Despite the infections, the viral loads were not detected during testing and so he was not offered treatment.    Started on losartan 100 mg and torsemide 40 mg twice a day by nephrology.  He be discharged on this medication regimen and will have to follow-up with nephrology in the outpatient setting the week following discharge.    Prior to discharge had a paracentesis done to 2.2 L of fluid removal.      Primary care physician was also arranged for the patient and he will have a bed appointment on Monday, August 15.     Day of Discharge     Physical Exam:  Temp:  [97.9 °F (36.6 °C)-98.6 °F (37 °C)] 98.2 °F (36.8 °C)  Heart Rate:  [75-96] 82  Resp:  [18-20] 18  BP: (108-115)/(71-81) 113/73  Body mass index is 38.11 kg/m².  Physical Exam  Constitutional:       Appearance: Normal appearance.   Cardiovascular:      Rate and Rhythm: Normal rate and regular rhythm.   Pulmonary:      Effort: Pulmonary effort is normal. No respiratory distress.   Abdominal:      General: There is distension.      Palpations: Abdomen is soft.      Tenderness: There is no abdominal tenderness.   Musculoskeletal:         General: Swelling present.      Right lower leg:  Edema present.      Left lower leg: Edema present.   Skin:     General: Skin is warm and dry.   Neurological:      General: No focal deficit present.      Mental Status: He is alert and oriented to person, place, and time.         Consultants     Consult Orders (all) (From admission, onward)     Start     Ordered    08/09/22 0000  Inpatient Access Center Consult  Once        Provider:  (Not yet assigned)    08/08/22 1644    08/08/22 1047  Inpatient Gastroenterology Consult  Once        Specialty:  Gastroenterology  Provider:  Susan Watters MD    08/08/22 1047    08/07/22 1229  Inpatient Nephrology Consult  Once        Specialty:  Nephrology  Provider:  Nick Mary MD    08/07/22 1229    08/07/22 1000  Inpatient Case Management  Consult  Once        Provider:  (Not yet assigned)    08/07/22 0242    08/07/22 0140  Inpatient Cardiology Consult  Once        Specialty:  Cardiology  Provider:  Zurdo Gillima MD    08/07/22 0140    08/06/22 2349  [facility] (on-call MD unless specified) Details  Once        Specialty:  Hospitalist  Provider:  (Not yet assigned)    08/06/22 2348              Procedures     Imaging Results (All)     Procedure Component Value Units Date/Time    US Paracentesis [[id number]] Collected: 08/09/22 1553    Specimen: Body Fluid Updated: 08/09/22 1608    Narrative:      ULTRASOUND-GUIDED PARACENTESIS     HISTORY: Ascites     After signed informed consent was obtained, the abdomen was prepped and  draped in the usual sterile fashion with 2% chlorhexidine. Lidocaine was  used for local anesthesia.     A 5 French catheter was inserted into the right lower quadrant using  ultrasound guidance. 2.2 L of yellow color ascites was removed. Sample  was sent to the lab.     Confirmatory images were obtained.     Patient tolerated the procedure well with no complications.       Impression:      Ultrasound-guided paracentesis as described.     This report was finalized on  8/9/2022 3:53 PM by Dr. Jose Carlos Lemos M.D.       XR Abdomen KUB [[id number]] Collected: 08/09/22 0203     Updated: 08/09/22 0207    Narrative:      KUB     HISTORY: Abdominal pain     COMPARISON: 08/08/2022     FINDINGS:  No free air seen beneath the diaphragm. Visualized bowel gas pattern is  nonobstructive.       Impression:      Nonobstructive bowel gas pattern.     This report was finalized on 8/9/2022 2:04 AM by Dr. Emelyn Matthews M.D.       CT Abdomen Pelvis Stone Protocol [[id number]] Collected: 08/08/22 1553     Updated: 08/08/22 1605    Narrative:      CT ABDOMEN AND PELVIS WITH IV CONTRAST     HISTORY: Ascites back pain. Status post left kidney biopsy 07/26/2022  lymphatics syndrome/glomerulonephritis.     TECHNIQUE: Radiation dose reduction techniques were utilized, including  automated exposure control and exposure modulation based on body size.   3 mm images were obtained through the abdomen and pelvis after the  administration of IV contrast.      COMPARISON: Ultrasound 07/23/2022     FINDINGS:  Lower chest: Small bilateral pleural effusions with bibasilar airspace  disease left greater than right new from 07/26/2022. The heart size is  stable..     Liver: Morphologic changes of cirrhosis with diffuse shrunken nodular  appearance of the liver. Evaluation for underlying lesions is limited on  this noncontrast exam. Recommend correlation without acute or  proteinaceous continued active surveillance with cirrhosis protocol  imaging. Biliary tract: Within normal limits.     Spleen: Splenomegaly (18 cm).     Pancreas: Within normal limits.     Adrenals: Within normal limits.     Kidneys:  No hydronephrosis or radiopaque stones. No new subjacent focal  fluid collections.     Bowel:  Circumferential thickening of the distal esophagus. The stomach  is incompletely distended. Small bowel edema. Colonic diverticulosis  without acute diverticulitis. Ensure up-to-date with colonoscopy.     Peritoneum: Large  volume ascites. No free intraperitoneal air..     Vasculature:    Multiple collaterals including gastroesophageal varices  in the upper abdomen. Scattered calcific atherosclerosis..     Lymph Nodes:  Mildly prominent periportal nodes likely reactive and can  be followed on subsequent surveillance.                                                            Pelvic organs: Bladder incompletely distended. Prostatic calcifications.     Abdominal/Pelvic Wall: Diffuse anasarca.     Bones: Multilevel degenerative changes most prominent at L5-S1.       Impression:      1.  Small to moderate bilateral pleural effusions with bibasilar  airspace disease. Recommend follow-up to resolution after treatment.  2.  Morphologic changes of cirrhosis and portal hypertension with  splenomegaly, bowel edema, large volume ascites, and portosystemic  shunting. Recommend continued surveillance imaging.  3.  Colonic diverticulosis.  4.  Periportal adenopathy likely reactive and can be followed on  subsequent surveillance.  5.  Please see above for additional findings.        This report was finalized on 8/8/2022 4:02 PM by Dr. Regi Jackson M.D.       XR Chest 1 View [[id number]] Collected: 08/06/22 2226     Updated: 08/06/22 2231    Narrative:      SINGLE VIEW OF THE CHEST     HISTORY: Shortness of air     COMPARISON: 07/22/2022     FINDINGS:  Heart size is within normal limits for technique. No pneumothorax or  pleural effusion is seen. Elevation of right hemidiaphragm is unchanged.  There may be some mild vascular congestion. There is nonspecific left  basilar consolidation.       Impression:         1. Nonspecific bibasilar consolidation.  2. Mild vascular congestion.     This report was finalized on 8/6/2022 10:27 PM by Dr. Emelyn Matthews M.D.             Pertinent Labs     Results from last 7 days   Lab Units 08/10/22  0406 08/09/22  0457 08/07/22  2347 08/07/22  0615   WBC 10*3/mm3 2.98* 3.51 4.31 3.91   HEMOGLOBIN g/dL 9.9* 9.6*  11.3* 9.5*   PLATELETS 10*3/mm3 115* 123* 148 129*     Results from last 7 days   Lab Units 08/10/22  0406 08/09/22 0457 08/07/22 2347 08/07/22  0615   SODIUM mmol/L 134* 132* 140 140   POTASSIUM mmol/L 4.5 4.0 3.8  3.8 2.9*   CHLORIDE mmol/L 101 98 100 102   CO2 mmol/L 28.0 29.2* 34.9* 31.0*   BUN mg/dL 8 9 9 9   CREATININE mg/dL 0.64* 0.65* 0.70* 0.64*   GLUCOSE mg/dL 90 89 88 110*   Estimated Creatinine Clearance: 200.8 mL/min (A) (by C-G formula based on SCr of 0.64 mg/dL (L)).  Results from last 7 days   Lab Units 08/10/22  0406 08/09/22  0457 08/07/22  2347 08/06/22  2242   ALBUMIN g/dL 2.10* 1.90* 2.30* 2.10*   BILIRUBIN mg/dL  --  0.3 0.3 0.2   ALK PHOS U/L  --  108 125* 121*   AST (SGOT) U/L  --  39 44* 46*   ALT (SGPT) U/L  --  19 22 21     Results from last 7 days   Lab Units 08/10/22  0406 08/09/22  1741 08/09/22 0457 08/07/22 2347 08/07/22  0615 08/06/22  2242   CALCIUM mg/dL 8.1*  --  7.8* 8.3* 7.4* 7.9*   ALBUMIN g/dL 2.10*  --  1.90* 2.30*  --  2.10*   MAGNESIUM mg/dL 1.7  --  1.4*  --  1.9 1.7   PHOSPHORUS mg/dL 2.8 2.9 2.5  --   --   --        Results from last 7 days   Lab Units 08/06/22  2242   TROPONIN T ng/mL <0.010   PROBNP pg/mL 262.0     Results from last 7 days   Lab Units 08/10/22  0406   URIC ACID mg/dL 4.5         Invalid input(s): LDLCALC  Results from last 7 days   Lab Units 08/09/22  1525   BODYFLDCX  No growth       Test Results Pending at Discharge     Pending Labs     Order Current Status    Non-gynecologic Cytology Collected (08/09/22 1525)    Anaerobic Culture - Body Fluid, Peritoneum In process    Flow Cytometry In process    Hepatitis B E Antibody In process    Body Fluid Culture - Body Fluid, Peritoneum Preliminary result          Discharge Details        Discharge Medications      New Medications      Instructions Start Date   losartan 100 MG tablet  Commonly known as: COZAAR   100 mg, Oral, Every 24 Hours Scheduled   Start Date: August 11, 2022     nicotine 21 MG/24HR  patch  Commonly known as: NICODERM CQ   1 patch, Transdermal, Every 24 Hours Scheduled   Start Date: August 11, 2022     Torsemide 40 MG tablet   40 mg, Oral, Every 12 Hours             No Known Allergies      Discharge Disposition:  Home or Self Care    Discharge Diet:  Diet Order   Procedures   • Diet Regular; Cardiac; Safe Tray       Discharge Activity:       CODE STATUS:    Code Status and Medical Interventions:   Ordered at: 08/07/22 0140     Code Status (Patient has no pulse and is not breathing):    CPR (Attempt to Resuscitate)     Medical Interventions (Patient has pulse or is breathing):    Full Support       Future Appointments   Date Time Provider Department Center   8/15/2022 10:00 AM Juan Leigh APRN [facility]      Follow-up Information     Juan Leigh APRN .    Specialty: Nurse Practitioner  Contact information:  [facility] [id number] [phone]                         Time Spent on Discharge:  Greater than 30 minutes      Gautam Sutton MD  [facility]  08/10/22  10:07 EDT                Electronically signed by Gautam Sutton MD at 08/10/22 7216

## 2022-08-10 NOTE — PROGRESS NOTES
[facility] Progress Note      Patient Name: Maged Pantoja  : 1975  MRN: [id number]  Primary Care Physician:  Juan Leigh APRN  Date of admission: 2022    Subjective     Interval History:   Follow-up anasarca and nephrotic syndrome with membranous GN    Patient had paracentesis yesterday 2.2 L fluid drained, he is feeling the same.    He denies any chest pain or shortness of air, no orthopnea or PND, no nausea or vomiting, he had significant edema.    Review of Systems:   As noted above    Objective     Vitals:   Temp:  [97.9 °F (36.6 °C)-98.6 °F (37 °C)] 98.2 °F (36.8 °C)  Heart Rate:  [75-96] 82  Resp:  [18-20] 18  BP: (108-115)/(71-81) 113/73    Intake/Output Summary (Last 24 hours) at 8/10/2022 0925  Last data filed at 2022 1900  Gross per 24 hour   Intake 760 ml   Output 4650 ml   Net -3890 ml       Physical Exam:    General Appearance: alert, oriented x 3, no acute distress   Skin: warm and dry, numerous tattoos  HEENT: oral mucosa normal, nonicteric sclera  Neck: supple, no JVD  Lungs: CTA, unlabored breathing effort  Heart: RRR, normal S1 and S2, no S3, no  Abdomen: soft, nontender, nondistended, normoactive  : no palpable bladder  Extremities: 4+ lower extremity with hip and thigh edema  Neuro: normal speech and mental status     Scheduled Meds:     enoxaparin, 40 mg, Subcutaneous, Daily  losartan, 100 mg, Oral, Q24H  nicotine, 1 patch, Transdermal, Q24H  sodium chloride, 10 mL, Intravenous, Q12H  torsemide, 40 mg, Oral, BID      IV Meds:        Results Reviewed:   I have personally reviewed the results from the time of this admission to 8/10/2022 09:25 EDT     Results from last 7 days   Lab Units 08/10/22  0406 22  0457 22  2347 22  0615 22  2242   SODIUM mmol/L 134* 132* 140   < > 142   POTASSIUM mmol/L 4.5 4.0 3.8  3.8   < > 3.0*   CHLORIDE mmol/L 101 98 100   < > 103   CO2 mmol/L 28.0 29.2* 34.9*   < > 31.2*   BUN mg/dL 8 9 9    < > 9   CREATININE mg/dL 0.64* 0.65* 0.70*   < > 0.70*   CALCIUM mg/dL 8.1* 7.8* 8.3*   < > 7.9*   BILIRUBIN mg/dL  --  0.3 0.3  --  0.2   ALK PHOS U/L  --  108 125*  --  121*   ALT (SGPT) U/L  --  19 22  --  21   AST (SGOT) U/L  --  39 44*  --  46*   GLUCOSE mg/dL 90 89 88   < > 115*    < > = values in this interval not displayed.       Estimated Creatinine Clearance: 200.8 mL/min (A) (by C-G formula based on SCr of 0.64 mg/dL (L)).    Results from last 7 days   Lab Units 08/10/22  0406 08/09/22  1741 08/09/22  0457 08/07/22  0615   MAGNESIUM mg/dL 1.7  --  1.4* 1.9   PHOSPHORUS mg/dL 2.8 2.9 2.5  --        Results from last 7 days   Lab Units 08/10/22  0406 08/09/22  0457   URIC ACID mg/dL 4.5 4.9       Results from last 7 days   Lab Units 08/10/22  0406 08/09/22  0457 08/07/22  2347 08/07/22  0615 08/06/22  2242   WBC 10*3/mm3 2.98* 3.51 4.31 3.91 5.40   HEMOGLOBIN g/dL 9.9* 9.6* 11.3* 9.5* 10.5*   PLATELETS 10*3/mm3 115* 123* 148 129* 157       Results from last 7 days   Lab Units 08/07/22  0615   INR  1.15*       Assessment / Plan     ASSESSMENT:  1.  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  2.  Anasarca associated with hypoalbuminemia and nephrotic syndrome  3.  Hepatitis C infection with cirrhosis of the liver with ascites  4.  History of chronic systolic dysfunction  5.  Prior history of substance abuse    PLAN:  -Continue the same treatment with ARB and diuretic  -He could be discharged from the renal standpoint and will follow  him very closely in my office.  -I also advised the patient to avoid all type of nonsteroidal anti-inflammatory drugs, follow no added salt diet and drink only when thirsty.    Thank you for involving us in the care of Maged Pantoja.  Please feel free to call with any questions.    Nicholas Mario MD  08/10/22  09:25 EDT    [facility]  [phone]      Much of this encounter note is an electronic transcription/translation of spoken language to printed text. The electronic translation of spoken language may permit erroneous, or at times, nonsensical words or phrases to be inadvertently transcribed; Although I have reviewed the note for such errors, some may still exist.

## 2022-08-10 NOTE — OUTREACH NOTE
Prep Survey    Flowsheet Row Responses   [facility] facility patient discharged from? [place]   Is LACE score < 7 ? No   Emergency Room discharge w/ pulse ox? No   Eligibility [facility]   Date of Admission 08/06/22   Date of Discharge 08/10/22   Discharge Disposition Home or Self Care   Discharge diagnosis Anasarca  Membranous glomerulonephritis Acute on chronic systolic CHF Cirrhosis of liver with ascites    Does the patient have one of the following disease processes/diagnoses(primary or secondary)? CHF   Does the patient have Home health ordered? No   Is there a DME ordered? No   Prep survey completed? Yes          BI ROSA - Registered Nurse

## 2022-08-10 NOTE — DISCHARGE SUMMARY
Patient Name: Maged Pantoja  : 1975  MRN: [id number]    Date of Admission: 2022  Date of Discharge:  8/10/2022  Primary Care Physician: Juan Leigh APRN      Chief Complaint:   Chest Pain      Discharge Diagnoses     Active Hospital Problems    Diagnosis  POA   • **Anasarca [R60.1]  Yes   • 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        Hospital Course     This is a 47-year-old male with a past medical history of cirrhosis complicated by cervicitis, substance abuse, recently diagnosed membranous glomerulonephritis who came to the hospital with edema.  GI was consulted along with nephrology and cardiology.  Ultimately it was thought that his nephrotic syndrome anasarca was secondary to membranous glomerulonephritis that was due to his hepatitis B and C infections.  Despite the infections, the viral loads were not detected during testing and so he was not offered treatment.    Started on losartan 100 mg and torsemide 40 mg twice a day by nephrology.  He be discharged on this medication regimen and will have to follow-up with nephrology in the outpatient setting the week following discharge.    Prior to discharge had a paracentesis done to 2.2 L of fluid removal.      Primary care physician was also arranged for the patient and he will have a bed appointment on Monday, August 15.     Day of Discharge     Physical Exam:  Temp:  [97.9 °F (36.6 °C)-98.6 °F (37 °C)] 98.2 °F (36.8 °C)  Heart Rate:  [75-96] 82  Resp:  [18-20] 18  BP: (108-115)/(71-81) 113/73  Body mass index is 38.11 kg/m².  Physical Exam  Constitutional:        Appearance: Normal appearance.   Cardiovascular:      Rate and Rhythm: Normal rate and regular rhythm.   Pulmonary:      Effort: Pulmonary effort is normal. No respiratory distress.   Abdominal:      General: There is distension.      Palpations: Abdomen is soft.      Tenderness: There is no abdominal tenderness.   Musculoskeletal:         General: Swelling present.      Right lower leg: Edema present.      Left lower leg: Edema present.   Skin:     General: Skin is warm and dry.   Neurological:      General: No focal deficit present.      Mental Status: He is alert and oriented to person, place, and time.         Consultants     Consult Orders (all) (From admission, onward)     Start     Ordered    08/09/22 0000  Inpatient Access Center Consult  Once        Provider:  (Not yet assigned)    08/08/22 1644    08/08/22 1047  Inpatient Gastroenterology Consult  Once        Specialty:  Gastroenterology  Provider:  Susan Watters MD    08/08/22 1047    08/07/22 1229  Inpatient Nephrology Consult  Once        Specialty:  Nephrology  Provider:  Nick Mary MD    08/07/22 1229    08/07/22 1000  Inpatient Case Management  Consult  Once        Provider:  (Not yet assigned)    08/07/22 0242    08/07/22 0140  Inpatient Cardiology Consult  Once        Specialty:  Cardiology  Provider:  Zurdo Gilliam MD    08/07/22 0140    08/06/22 2349  [facility] (on-call MD unless specified) Details  Once        Specialty:  Hospitalist  Provider:  (Not yet assigned)    08/06/22 2348              Procedures     Imaging Results (All)     Procedure Component Value Units Date/Time    US Paracentesis [[id number]] Collected: 08/09/22 1553    Specimen: Body Fluid Updated: 08/09/22 1608    Narrative:      ULTRASOUND-GUIDED PARACENTESIS     HISTORY: Ascites     After signed informed consent was obtained, the abdomen was prepped and  draped in the usual sterile fashion with 2% chlorhexidine. Lidocaine was  used for  local anesthesia.     A 5 Taiwanese catheter was inserted into the right lower quadrant using  ultrasound guidance. 2.2 L of yellow color ascites was removed. Sample  was sent to the lab.     Confirmatory images were obtained.     Patient tolerated the procedure well with no complications.       Impression:      Ultrasound-guided paracentesis as described.     This report was finalized on 8/9/2022 3:53 PM by Dr. Jose Carlos Lemos M.D.       XR Abdomen KUB [[id number]] Collected: 08/09/22 0203     Updated: 08/09/22 0207    Narrative:      KUB     HISTORY: Abdominal pain     COMPARISON: 08/08/2022     FINDINGS:  No free air seen beneath the diaphragm. Visualized bowel gas pattern is  nonobstructive.       Impression:      Nonobstructive bowel gas pattern.     This report was finalized on 8/9/2022 2:04 AM by Dr. Emelyn Matthews M.D.       CT Abdomen Pelvis Stone Protocol [[id number]] Collected: 08/08/22 1553     Updated: 08/08/22 1605    Narrative:      CT ABDOMEN AND PELVIS WITH IV CONTRAST     HISTORY: Ascites back pain. Status post left kidney biopsy 07/26/2022  lymphatics syndrome/glomerulonephritis.     TECHNIQUE: Radiation dose reduction techniques were utilized, including  automated exposure control and exposure modulation based on body size.   3 mm images were obtained through the abdomen and pelvis after the  administration of IV contrast.      COMPARISON: Ultrasound 07/23/2022     FINDINGS:  Lower chest: Small bilateral pleural effusions with bibasilar airspace  disease left greater than right new from 07/26/2022. The heart size is  stable..     Liver: Morphologic changes of cirrhosis with diffuse shrunken nodular  appearance of the liver. Evaluation for underlying lesions is limited on  this noncontrast exam. Recommend correlation without acute or  proteinaceous continued active surveillance with cirrhosis protocol  imaging. Biliary tract: Within normal limits.     Spleen: Splenomegaly (18 cm).     Pancreas:  Within normal limits.     Adrenals: Within normal limits.     Kidneys:  No hydronephrosis or radiopaque stones. No new subjacent focal  fluid collections.     Bowel:  Circumferential thickening of the distal esophagus. The stomach  is incompletely distended. Small bowel edema. Colonic diverticulosis  without acute diverticulitis. Ensure up-to-date with colonoscopy.     Peritoneum: Large volume ascites. No free intraperitoneal air..     Vasculature:    Multiple collaterals including gastroesophageal varices  in the upper abdomen. Scattered calcific atherosclerosis..     Lymph Nodes:  Mildly prominent periportal nodes likely reactive and can  be followed on subsequent surveillance.                                                            Pelvic organs: Bladder incompletely distended. Prostatic calcifications.     Abdominal/Pelvic Wall: Diffuse anasarca.     Bones: Multilevel degenerative changes most prominent at L5-S1.       Impression:      1.  Small to moderate bilateral pleural effusions with bibasilar  airspace disease. Recommend follow-up to resolution after treatment.  2.  Morphologic changes of cirrhosis and portal hypertension with  splenomegaly, bowel edema, large volume ascites, and portosystemic  shunting. Recommend continued surveillance imaging.  3.  Colonic diverticulosis.  4.  Periportal adenopathy likely reactive and can be followed on  subsequent surveillance.  5.  Please see above for additional findings.        This report was finalized on 8/8/2022 4:02 PM by Dr. Regi Jackson M.D.       XR Chest 1 View [[id number]] Collected: 08/06/22 2226     Updated: 08/06/22 2231    Narrative:      SINGLE VIEW OF THE CHEST     HISTORY: Shortness of air     COMPARISON: 07/22/2022     FINDINGS:  Heart size is within normal limits for technique. No pneumothorax or  pleural effusion is seen. Elevation of right hemidiaphragm is unchanged.  There may be some mild vascular congestion. There is nonspecific left  basilar  consolidation.       Impression:         1. Nonspecific bibasilar consolidation.  2. Mild vascular congestion.     This report was finalized on 8/6/2022 10:27 PM by Dr. Emelyn Matthews M.D.             Pertinent Labs     Results from last 7 days   Lab Units 08/10/22  0406 08/09/22  0457 08/07/22  2347 08/07/22  0615   WBC 10*3/mm3 2.98* 3.51 4.31 3.91   HEMOGLOBIN g/dL 9.9* 9.6* 11.3* 9.5*   PLATELETS 10*3/mm3 115* 123* 148 129*     Results from last 7 days   Lab Units 08/10/22  0406 08/09/22  0457 08/07/22  2347 08/07/22  0615   SODIUM mmol/L 134* 132* 140 140   POTASSIUM mmol/L 4.5 4.0 3.8  3.8 2.9*   CHLORIDE mmol/L 101 98 100 102   CO2 mmol/L 28.0 29.2* 34.9* 31.0*   BUN mg/dL 8 9 9 9   CREATININE mg/dL 0.64* 0.65* 0.70* 0.64*   GLUCOSE mg/dL 90 89 88 110*   Estimated Creatinine Clearance: 200.8 mL/min (A) (by C-G formula based on SCr of 0.64 mg/dL (L)).  Results from last 7 days   Lab Units 08/10/22  0406 08/09/22  0457 08/07/22  2347 08/06/22  2242   ALBUMIN g/dL 2.10* 1.90* 2.30* 2.10*   BILIRUBIN mg/dL  --  0.3 0.3 0.2   ALK PHOS U/L  --  108 125* 121*   AST (SGOT) U/L  --  39 44* 46*   ALT (SGPT) U/L  --  19 22 21     Results from last 7 days   Lab Units 08/10/22  0406 08/09/22  1741 08/09/22 0457 08/07/22 2347 08/07/22  0615 08/06/22  2242   CALCIUM mg/dL 8.1*  --  7.8* 8.3* 7.4* 7.9*   ALBUMIN g/dL 2.10*  --  1.90* 2.30*  --  2.10*   MAGNESIUM mg/dL 1.7  --  1.4*  --  1.9 1.7   PHOSPHORUS mg/dL 2.8 2.9 2.5  --   --   --        Results from last 7 days   Lab Units 08/06/22  2242   TROPONIN T ng/mL <0.010   PROBNP pg/mL 262.0     Results from last 7 days   Lab Units 08/10/22  0406   URIC ACID mg/dL 4.5         Invalid input(s): LDLCALC  Results from last 7 days   Lab Units 08/09/22  1525   BODYFLDCX  No growth       Test Results Pending at Discharge     Pending Labs     Order Current Status    Non-gynecologic Cytology Collected (08/09/22 1525)    Anaerobic Culture - Body Fluid, Peritoneum In process     Flow Cytometry In process    Hepatitis B E Antibody In process    Body Fluid Culture - Body Fluid, Peritoneum Preliminary result          Discharge Details        Discharge Medications      New Medications      Instructions Start Date   losartan 100 MG tablet  Commonly known as: COZAAR   100 mg, Oral, Every 24 Hours Scheduled   Start Date: August 11, 2022     nicotine 21 MG/24HR patch  Commonly known as: NICODERM CQ   1 patch, Transdermal, Every 24 Hours Scheduled   Start Date: August 11, 2022     Torsemide 40 MG tablet   40 mg, Oral, Every 12 Hours             No Known Allergies      Discharge Disposition:  Home or Self Care    Discharge Diet:  Diet Order   Procedures   • Diet Regular; Cardiac; Safe Tray       Discharge Activity:       CODE STATUS:    Code Status and Medical Interventions:   Ordered at: 08/07/22 0140     Code Status (Patient has no pulse and is not breathing):    CPR (Attempt to Resuscitate)     Medical Interventions (Patient has pulse or is breathing):    Full Support       Future Appointments   Date Time Provider Department Center   8/15/2022 10:00 AM [facility]      Follow-up Information     Juan Leigh APRN .    Specialty: Nurse Practitioner  Contact information:  [facility] [place] [id number] [phone]                         Time Spent on Discharge:  Greater than 30 minutes      Gautam Sutton MD  [facility]  08/10/22  10:07 EDT

## 2022-08-10 NOTE — CASE MANAGEMENT/SOCIAL WORK
Case Management Discharge Note      Final Note: Discharge to home. DRK    Provided Post Acute Provider List?: Yes  Post Acute Provider List: Nursing Home, Home Health  Delivered To: Patient  Method of Delivery: In person    Selected Continued Care - Discharged on 8/10/2022 Admission date: 8/6/2022 - Discharge disposition: Home or Self Care    Destination    No services have been selected for the patient.              Durable Medical Equipment    No services have been selected for the patient.              Dialysis/Infusion    No services have been selected for the patient.              Home Medical Care    No services have been selected for the patient.              Therapy    No services have been selected for the patient.              Community Resources    No services have been selected for the patient.              Community & DME    No services have been selected for the patient.                  Transportation Services  Taxi:  ([facility] arranged by [employer])  Public Transit/Bus Service: [employer]    Final Discharge Disposition Code: 01 - home or self-care

## 2022-08-11 ENCOUNTER — TRANSITIONAL CARE MANAGEMENT TELEPHONE ENCOUNTER (OUTPATIENT)
Dept: CALL CENTER | Facility: HOSPITAL | Age: 47
End: 2022-08-11

## 2022-08-11 NOTE — OUTREACH NOTE
Call Center TCM Note    Flowsheet Row Responses   [facility] patient discharged from? [place]   Does the patient have one of the following disease processes/diagnoses(primary or secondary)? CHF   TCM attempt successful? Yes   Discharge diagnosis Anasarca  Membranous glomerulonephritis Acute on chronic systolic CHF Cirrhosis of liver with ascites    Meds reviewed with patient/caregiver? Yes   Is the patient having any side effects they believe may be caused by any medication additions or changes? No   Does the patient have all medications ordered at discharge? Yes   Is the patient taking all medications as directed (includes completed medication regime)? Yes   Does the patient have a primary care provider?  Yes   Does the patient have an appointment with their PCP within 7 days of discharge? Yes   Comments regarding PCP NP/TCM appt with EMI Juan Leigh is 08/15/2022.   Has the patient kept scheduled appointments due by today? N/A   Has home health visited the patient within 72 hours of discharge? N/A   Pulse Ox monitoring None   Psychosocial issues? No   Did the patient receive a copy of their discharge instructions? Yes   Nursing interventions Reviewed instructions with patient   What is the patient's perception of their health status since discharge? Improving   Nursing interventions Nurse provided patient education   Is the patient weighing daily? No   Does the patient have scales? No   Daily weight interventions Education provided on importance of daily weight   Is the patient able to teach back Heart Failure diet management? No   Is the patient able to teach back signs and symptoms of worsening condition? (i.e. weight gain, shortness of air, etc.) Yes   Is the patient/caregiver able to teach back the hierarchy of who to call/visit for symptoms/problems? PCP, Specialist, Home health nurse, Urgent Care, ED, 911 Yes   TCM call completed? Yes   Wrap up additional comments D/C DX: Tony Membranous  glomerulonephritis,  A/C systolic CHF,  Cirrhosis of liver w/ascites** Pt states SOB is better but LE edema about the same. Instructed on LE elevation, obtaining scales & Pulse Ox for his home. New rx, s Losartan, Nicotine Step 1, Torsemide in place. Pt has no questions. NP/TCM appt with EMI Leigh is 08/15/2022.            Flavia Avery MA    8/11/2022, 11:58 EDT

## 2022-08-12 LAB
CYTO UR: NORMAL
LAB AP CASE REPORT: NORMAL
PATH REPORT.ADDENDUM SPEC: NORMAL
PATH REPORT.FINAL DX SPEC: NORMAL
PATH REPORT.GROSS SPEC: NORMAL

## 2022-08-14 LAB
BACTERIA FLD CULT: NORMAL
BACTERIA SPEC ANAEROBE CULT: NORMAL
GRAM STN SPEC: NORMAL
GRAM STN SPEC: NORMAL

## 2022-08-14 NOTE — PROGRESS NOTES
"Enter Query Response Below      Query Response: patient does not have CHF             If applicable, please update the problem list.        Patient: Maged Pantoja        : 1975  Account: [id number]           Admit Date: 2022        Options to Respond to Query:    1. Access the Encounter     a. From the To-Do Side bar, click Respond With Note.     b. Click New Note     c. Answer query within the yellow box.                d. Update the Problem List if applicable.     Dr. Sutton,    Cardiology consultation dated  includes, \"This patient does not have CHF. His echo shows normal LVSF by my read. He has pseudohypokinesis due to ascites and the change in thoraco-abdominal hemodynamics associated with that. He does not have ischemic regional WMA. A stress was normal and his EF  is 60%.\"  Discharge summary includes diagnosis of acute on chronic systolic CHF present on admission in Discharge Diagnoses list.    Can this be further clarified as:    - acute on chronic systolic CHF  - patient does not have CHF  - other ___________________  - unable to clinically determine    By submitting this query, we are merely seeking further clarification of documentation to accurately reflect all conditions that you are monitoring, evaluating, treating or that extend the hospitalization or utilize additional resources of care. Please utilize your independent clinical judgment when addressing the question(s) above.     This query and your response, once completed, will be entered into the legal medical record.    Sincerely,  Mary Ma RN CCDS CCS  Clinical Documentation Integrity Program   [email or web address]  "

## 2022-08-17 ENCOUNTER — TELEPHONE (OUTPATIENT)
Dept: CARDIOLOGY | Facility: CLINIC | Age: 47
End: 2022-08-17

## 2022-08-18 ENCOUNTER — READMISSION MANAGEMENT (OUTPATIENT)
Dept: CALL CENTER | Facility: HOSPITAL | Age: 47
End: 2022-08-18

## 2022-08-18 NOTE — OUTREACH NOTE
CHF Week 2 Survey    Flowsheet Row Responses   [facility] patient discharged from? [place]   Does the patient have one of the following disease processes/diagnoses(primary or secondary)? CHF   Week 2 attempt successful? Yes   Call start time 1533   Call end time 1541   Meds reviewed with patient/caregiver? Yes   Is the patient taking all medications as directed (includes completed medication regime)? Yes   Comments regarding appointments Pt has been a no show on all appointments due to transportation.   Has the patient kept scheduled appointments due by today? No   What is preventing the patient from keeping their appointments? Transportation   Has home health visited the patient within 72 hours of discharge? N/A   Psychosocial comments Pt bouncing from friend to friend homes with no transportation.   What is the patient's perception of their health status since discharge? Same   Wrap up additional comments Pt reports not feeling well. Pt has LE edema with bloated abdomen. Pt stays with friends, pt does not have transportation to follow up appointments. Pt strongly encouraged to investigate public transportation in order to keep appointments. Pt instructed to return to ED if s/s worsen.          KENYATTA YOUNGER - Registered Nurse

## 2022-08-26 ENCOUNTER — READMISSION MANAGEMENT (OUTPATIENT)
Dept: CALL CENTER | Facility: HOSPITAL | Age: 47
End: 2022-08-26

## 2022-08-26 NOTE — OUTREACH NOTE
CHF Week 3 Survey    Flowsheet Row Responses   [facility] patient discharged from? [place]   Does the patient have one of the following disease processes/diagnoses(primary or secondary)? CHF   Week 3 attempt successful? Yes   Call start time 1616   Rescheduled Rescheduled-pt requested   Call end time 1616   Discharge diagnosis Anasarca  Membranous glomerulonephritis Acute on chronic systolic CHF Cirrhosis of liver with ascites           GLORIA FRIAS - Registered Nurse

## 2022-08-29 ENCOUNTER — READMISSION MANAGEMENT (OUTPATIENT)
Dept: CALL CENTER | Facility: HOSPITAL | Age: 47
End: 2022-08-29

## 2022-08-29 NOTE — OUTREACH NOTE
CHF Week 3 Survey    Flowsheet Row Responses   [facility] patient discharged from? [place]   Does the patient have one of the following disease processes/diagnoses(primary or secondary)? CHF   Week 3 attempt successful? Yes   Call start time 1613   Call end time 1615   Discharge diagnosis Anasarca  Membranous glomerulonephritis Acute on chronic systolic CHF Cirrhosis of liver with ascites    Meds reviewed with patient/caregiver? Yes   Is the patient having any side effects they believe may be caused by any medication additions or changes? No   Does the patient have all medications ordered at discharge? Yes   Is the patient taking all medications as directed (includes completed medication regime)? Yes   Does the patient have a primary care provider?  Yes   Has the patient kept scheduled appointments due by today? No   Has home health visited the patient within 72 hours of discharge? N/A   Pulse Ox monitoring None   Psychosocial issues? No   What is the patient's perception of their health status since discharge? Improving   Nursing interventions Nurse provided patient education   Is the patient weighing daily? No   Does the patient have scales? No   Daily weight interventions Education provided on importance of daily weight   Is the patient able to teach back Heart Failure diet management? Yes   Is the patient able to teach back Heart Failure Zones? Yes   Is the patient able to teach back signs and symptoms of worsening condition? (i.e. weight gain, shortness of air, etc.) Yes   If the patient is a current smoker, are they able to teach back resources for cessation? Smoking cessation medications   Is the patient/caregiver able to teach back the hierarchy of who to call/visit for symptoms/problems? PCP, Specialist, Home health nurse, Urgent Care, ED, 911 Yes   CHF Week 3 call completed? Yes          JUAN MIGUEL ACKERMAN - Registered Nurse

## 2024-03-21 ENCOUNTER — APPOINTMENT (OUTPATIENT)
Dept: GENERAL RADIOLOGY | Facility: HOSPITAL | Age: 49
End: 2024-03-21
Payer: MEDICAID

## 2024-03-21 ENCOUNTER — HOSPITAL ENCOUNTER (EMERGENCY)
Facility: HOSPITAL | Age: 49
Discharge: HOME | End: 2024-03-21
Attending: EMERGENCY MEDICINE
Payer: MEDICAID

## 2024-03-21 VITALS
RESPIRATION RATE: 16 BRPM | OXYGEN SATURATION: 96 % | WEIGHT: 279.98 LBS | SYSTOLIC BLOOD PRESSURE: 128 MMHG | HEART RATE: 75 BPM | BODY MASS INDEX: 37.92 KG/M2 | HEIGHT: 72 IN | TEMPERATURE: 97.9 F | DIASTOLIC BLOOD PRESSURE: 84 MMHG

## 2024-03-21 DIAGNOSIS — M79.89 SWELLING OF BOTH HANDS: ICD-10-CM

## 2024-03-21 DIAGNOSIS — M54.9 BACK PAIN, UNSPECIFIED BACK LOCATION, UNSPECIFIED BACK PAIN LATERALITY, UNSPECIFIED CHRONICITY: ICD-10-CM

## 2024-03-21 DIAGNOSIS — R09.81 NASAL CONGESTION: Primary | ICD-10-CM

## 2024-03-21 DIAGNOSIS — R05.9 COUGH, UNSPECIFIED TYPE: ICD-10-CM

## 2024-03-21 DIAGNOSIS — F19.10 SUBSTANCE ABUSE: ICD-10-CM

## 2024-03-21 LAB
ALBUMIN SERPL-MCNC: 3.8 G/DL (ref 3.5–5.2)
ALBUMIN/GLOB SERPL: 1.6 G/DL
ALP SERPL-CCNC: 106 U/L (ref 39–117)
ALT SERPL W P-5'-P-CCNC: 22 U/L (ref 1–41)
AMPHET+METHAMPHET UR QL: POSITIVE
ANION GAP SERPL CALCULATED.3IONS-SCNC: 7 MMOL/L (ref 5–15)
APTT PPP: 31.9 SECONDS (ref 22.7–35.4)
AST SERPL-CCNC: 33 U/L (ref 1–40)
B PARAPERT DNA SPEC QL NAA+PROBE: NOT DETECTED
B PERT DNA SPEC QL NAA+PROBE: NOT DETECTED
BARBITURATES UR QL SCN: NEGATIVE
BASOPHILS # BLD AUTO: 0.02 10*3/MM3 (ref 0–0.2)
BASOPHILS NFR BLD AUTO: 0.7 % (ref 0–1.5)
BENZODIAZ UR QL SCN: POSITIVE
BILIRUB SERPL-MCNC: 0.3 MG/DL (ref 0–1.2)
BILIRUB UR QL STRIP: NEGATIVE
BUN SERPL-MCNC: 8 MG/DL (ref 6–20)
BUN/CREAT SERPL: 10.5 (ref 7–25)
C PNEUM DNA NPH QL NAA+NON-PROBE: NOT DETECTED
CALCIUM SPEC-SCNC: 8.9 MG/DL (ref 8.6–10.5)
CANNABINOIDS SERPL QL: POSITIVE
CHLORIDE SERPL-SCNC: 106 MMOL/L (ref 98–107)
CLARITY UR: CLEAR
CO2 SERPL-SCNC: 28 MMOL/L (ref 22–29)
COCAINE UR QL: NEGATIVE
COLOR UR: YELLOW
CREAT SERPL-MCNC: 0.76 MG/DL (ref 0.76–1.27)
DEPRECATED RDW RBC AUTO: 45 FL (ref 37–54)
EGFRCR SERPLBLD CKD-EPI 2021: 111.6 ML/MIN/1.73
EOSINOPHIL # BLD AUTO: 0.09 10*3/MM3 (ref 0–0.4)
EOSINOPHIL NFR BLD AUTO: 3 % (ref 0.3–6.2)
ERYTHROCYTE [DISTWIDTH] IN BLOOD BY AUTOMATED COUNT: 14.6 % (ref 12.3–15.4)
ETHANOL BLD-MCNC: <10 MG/DL (ref 0–10)
ETHANOL UR QL: <0.01 %
FENTANYL UR-MCNC: POSITIVE NG/ML
FLUAV SUBTYP SPEC NAA+PROBE: NOT DETECTED
FLUBV RNA ISLT QL NAA+PROBE: NOT DETECTED
GLOBULIN UR ELPH-MCNC: 2.4 GM/DL
GLUCOSE SERPL-MCNC: 126 MG/DL (ref 65–99)
GLUCOSE UR STRIP-MCNC: NEGATIVE MG/DL
HADV DNA SPEC NAA+PROBE: NOT DETECTED
HCOV 229E RNA SPEC QL NAA+PROBE: NOT DETECTED
HCOV HKU1 RNA SPEC QL NAA+PROBE: NOT DETECTED
HCOV NL63 RNA SPEC QL NAA+PROBE: NOT DETECTED
HCOV OC43 RNA SPEC QL NAA+PROBE: NOT DETECTED
HCT VFR BLD AUTO: 39.9 % (ref 37.5–51)
HGB BLD-MCNC: 13.5 G/DL (ref 13–17.7)
HGB UR QL STRIP.AUTO: NEGATIVE
HMPV RNA NPH QL NAA+NON-PROBE: NOT DETECTED
HPIV1 RNA ISLT QL NAA+PROBE: NOT DETECTED
HPIV2 RNA SPEC QL NAA+PROBE: NOT DETECTED
HPIV3 RNA NPH QL NAA+PROBE: NOT DETECTED
HPIV4 P GENE NPH QL NAA+PROBE: NOT DETECTED
INR PPP: 1.13 (ref 0.9–1.1)
KETONES UR QL STRIP: NEGATIVE
LEUKOCYTE ESTERASE UR QL STRIP.AUTO: NEGATIVE
LYMPHOCYTES # BLD AUTO: 0.97 10*3/MM3 (ref 0.7–3.1)
LYMPHOCYTES NFR BLD AUTO: 32.1 % (ref 19.6–45.3)
M PNEUMO IGG SER IA-ACNC: NOT DETECTED
MCH RBC QN AUTO: 28.8 PG (ref 26.6–33)
MCHC RBC AUTO-ENTMCNC: 33.8 G/DL (ref 31.5–35.7)
MCV RBC AUTO: 85.3 FL (ref 79–97)
METHADONE UR QL SCN: NEGATIVE
MONOCYTES # BLD AUTO: 0.19 10*3/MM3 (ref 0.1–0.9)
MONOCYTES NFR BLD AUTO: 6.3 % (ref 5–12)
NEUTROPHILS NFR BLD AUTO: 1.73 10*3/MM3 (ref 1.7–7)
NEUTROPHILS NFR BLD AUTO: 57.2 % (ref 42.7–76)
NITRITE UR QL STRIP: NEGATIVE
OPIATES UR QL: POSITIVE
OXYCODONE UR QL SCN: NEGATIVE
PH UR STRIP.AUTO: 6.5 [PH] (ref 5–8)
PLATELET # BLD AUTO: 70 10*3/MM3 (ref 140–450)
PMV BLD AUTO: 11.1 FL (ref 6–12)
POTASSIUM SERPL-SCNC: 4.5 MMOL/L (ref 3.5–5.2)
PROT SERPL-MCNC: 6.2 G/DL (ref 6–8.5)
PROT UR QL STRIP: NEGATIVE
PROTHROMBIN TIME: 14.7 SECONDS (ref 11.7–14.2)
RBC # BLD AUTO: 4.68 10*6/MM3 (ref 4.14–5.8)
RHINOVIRUS RNA SPEC NAA+PROBE: NOT DETECTED
RSV RNA NPH QL NAA+NON-PROBE: NOT DETECTED
SARS-COV-2 RNA NPH QL NAA+NON-PROBE: NOT DETECTED
SODIUM SERPL-SCNC: 141 MMOL/L (ref 136–145)
SP GR UR STRIP: 1.02 (ref 1–1.03)
UROBILINOGEN UR QL STRIP: NORMAL
WBC NRBC COR # BLD AUTO: 3.02 10*3/MM3 (ref 3.4–10.8)

## 2024-03-21 PROCEDURE — 85610 PROTHROMBIN TIME: CPT | Performed by: EMERGENCY MEDICINE

## 2024-03-21 PROCEDURE — 80307 DRUG TEST PRSMV CHEM ANLYZR: CPT | Performed by: EMERGENCY MEDICINE

## 2024-03-21 PROCEDURE — 36415 COLL VENOUS BLD VENIPUNCTURE: CPT

## 2024-03-21 PROCEDURE — 81003 URINALYSIS AUTO W/O SCOPE: CPT | Performed by: EMERGENCY MEDICINE

## 2024-03-21 PROCEDURE — 0202U NFCT DS 22 TRGT SARS-COV-2: CPT | Performed by: EMERGENCY MEDICINE

## 2024-03-21 PROCEDURE — 80053 COMPREHEN METABOLIC PANEL: CPT | Performed by: EMERGENCY MEDICINE

## 2024-03-21 PROCEDURE — 85730 THROMBOPLASTIN TIME PARTIAL: CPT | Performed by: EMERGENCY MEDICINE

## 2024-03-21 PROCEDURE — 85025 COMPLETE CBC W/AUTO DIFF WBC: CPT | Performed by: EMERGENCY MEDICINE

## 2024-03-21 PROCEDURE — 82077 ASSAY SPEC XCP UR&BREATH IA: CPT | Performed by: EMERGENCY MEDICINE

## 2024-03-21 PROCEDURE — 99283 EMERGENCY DEPT VISIT LOW MDM: CPT

## 2024-03-21 PROCEDURE — 71045 X-RAY EXAM CHEST 1 VIEW: CPT

## 2024-03-21 RX ORDER — SODIUM CHLORIDE 0.9 % (FLUSH) 0.9 %
10 SYRINGE (ML) INJECTION AS NEEDED
Status: DISCONTINUED | OUTPATIENT
Start: 2024-03-21 | End: 2024-03-21 | Stop reason: HOSPADM

## 2024-03-21 NOTE — DISCHARGE INSTRUCTIONS
Recommend follow-up with your PCP and also with the liver/GI specialist as we discussed.  Please return to the emergency room for any worsening symptoms or concerns.
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
## 2024-03-29 ENCOUNTER — HOSPITAL ENCOUNTER (EMERGENCY)
Facility: HOSPITAL | Age: 49
Discharge: HOME | End: 2024-03-29
Attending: EMERGENCY MEDICINE
Payer: MEDICAID

## 2024-03-29 ENCOUNTER — APPOINTMENT (OUTPATIENT)
Dept: GENERAL RADIOLOGY | Facility: HOSPITAL | Age: 49
End: 2024-03-29
Payer: MEDICAID

## 2024-03-29 ENCOUNTER — APPOINTMENT (OUTPATIENT)
Dept: CT IMAGING | Facility: HOSPITAL | Age: 49
End: 2024-03-29
Payer: MEDICAID

## 2024-03-29 VITALS
TEMPERATURE: 98.1 F | WEIGHT: 279.98 LBS | HEART RATE: 72 BPM | BODY MASS INDEX: 37.92 KG/M2 | RESPIRATION RATE: 18 BRPM | OXYGEN SATURATION: 94 % | HEIGHT: 72 IN | SYSTOLIC BLOOD PRESSURE: 131 MMHG | DIASTOLIC BLOOD PRESSURE: 76 MMHG

## 2024-03-29 DIAGNOSIS — D69.6 THROMBOCYTOPENIA: ICD-10-CM

## 2024-03-29 DIAGNOSIS — F19.10 SUBSTANCE ABUSE: Primary | ICD-10-CM

## 2024-03-29 LAB
ALBUMIN SERPL-MCNC: 4.4 G/DL (ref 3.5–5.2)
ALBUMIN/GLOB SERPL: 1.5 G/DL
ALP SERPL-CCNC: 111 U/L (ref 39–117)
ALT SERPL W P-5'-P-CCNC: 25 U/L (ref 1–41)
ANION GAP SERPL CALCULATED.3IONS-SCNC: 12 MMOL/L (ref 5–15)
AST SERPL-CCNC: 31 U/L (ref 1–40)
BASOPHILS # BLD AUTO: 0.02 10*3/MM3 (ref 0–0.2)
BASOPHILS NFR BLD AUTO: 0.4 % (ref 0–1.5)
BILIRUB SERPL-MCNC: 0.8 MG/DL (ref 0–1.2)
BUN SERPL-MCNC: 14 MG/DL (ref 6–20)
BUN/CREAT SERPL: 16.1 (ref 7–25)
CALCIUM SPEC-SCNC: 9.3 MG/DL (ref 8.6–10.5)
CHLORIDE SERPL-SCNC: 108 MMOL/L (ref 98–107)
CO2 SERPL-SCNC: 23 MMOL/L (ref 22–29)
CREAT SERPL-MCNC: 0.87 MG/DL (ref 0.76–1.27)
DEPRECATED RDW RBC AUTO: 46.2 FL (ref 37–54)
EGFRCR SERPLBLD CKD-EPI 2021: 107.1 ML/MIN/1.73
EOSINOPHIL # BLD AUTO: 0.02 10*3/MM3 (ref 0–0.4)
EOSINOPHIL NFR BLD AUTO: 0.4 % (ref 0.3–6.2)
ERYTHROCYTE [DISTWIDTH] IN BLOOD BY AUTOMATED COUNT: 14.9 % (ref 12.3–15.4)
ETHANOL BLD-MCNC: <10 MG/DL (ref 0–10)
ETHANOL UR QL: <0.01 %
GEN 5 2HR TROPONIN T REFLEX: 8 NG/L
GLOBULIN UR ELPH-MCNC: 2.9 GM/DL
GLUCOSE SERPL-MCNC: 115 MG/DL (ref 65–99)
HCT VFR BLD AUTO: 43 % (ref 37.5–51)
HGB BLD-MCNC: 14.7 G/DL (ref 13–17.7)
LYMPHOCYTES # BLD AUTO: 0.74 10*3/MM3 (ref 0.7–3.1)
LYMPHOCYTES NFR BLD AUTO: 16.6 % (ref 19.6–45.3)
MCH RBC QN AUTO: 28.9 PG (ref 26.6–33)
MCHC RBC AUTO-ENTMCNC: 34.2 G/DL (ref 31.5–35.7)
MCV RBC AUTO: 84.6 FL (ref 79–97)
MONOCYTES # BLD AUTO: 0.22 10*3/MM3 (ref 0.1–0.9)
MONOCYTES NFR BLD AUTO: 4.9 % (ref 5–12)
NEUTROPHILS NFR BLD AUTO: 3.42 10*3/MM3 (ref 1.7–7)
NEUTROPHILS NFR BLD AUTO: 77 % (ref 42.7–76)
PLATELET # BLD AUTO: 69 10*3/MM3 (ref 140–450)
PMV BLD AUTO: 10.6 FL (ref 6–12)
POTASSIUM SERPL-SCNC: 3.9 MMOL/L (ref 3.5–5.2)
PROT SERPL-MCNC: 7.3 G/DL (ref 6–8.5)
QT INTERVAL: 436 MS
QTC INTERVAL: 471 MS
RBC # BLD AUTO: 5.08 10*6/MM3 (ref 4.14–5.8)
SODIUM SERPL-SCNC: 143 MMOL/L (ref 136–145)
TROPONIN T DELTA: 0 NG/L
TROPONIN T SERPL HS-MCNC: 8 NG/L
WBC NRBC COR # BLD AUTO: 4.45 10*3/MM3 (ref 3.4–10.8)

## 2024-03-29 PROCEDURE — 82077 ASSAY SPEC XCP UR&BREATH IA: CPT | Performed by: EMERGENCY MEDICINE

## 2024-03-29 PROCEDURE — 80053 COMPREHEN METABOLIC PANEL: CPT | Performed by: EMERGENCY MEDICINE

## 2024-03-29 PROCEDURE — 84484 ASSAY OF TROPONIN QUANT: CPT | Performed by: EMERGENCY MEDICINE

## 2024-03-29 PROCEDURE — 85025 COMPLETE CBC W/AUTO DIFF WBC: CPT | Performed by: EMERGENCY MEDICINE

## 2024-03-29 PROCEDURE — 36415 COLL VENOUS BLD VENIPUNCTURE: CPT

## 2024-03-29 PROCEDURE — 93010 ELECTROCARDIOGRAM REPORT: CPT | Performed by: INTERNAL MEDICINE

## 2024-03-29 PROCEDURE — 70450 CT HEAD/BRAIN W/O DYE: CPT

## 2024-03-29 PROCEDURE — 96374 THER/PROPH/DIAG INJ IV PUSH: CPT

## 2024-03-29 PROCEDURE — 93005 ELECTROCARDIOGRAM TRACING: CPT | Performed by: EMERGENCY MEDICINE

## 2024-03-29 PROCEDURE — 25010000002 DROPERIDOL PER 5 MG: Performed by: EMERGENCY MEDICINE

## 2024-03-29 PROCEDURE — 99284 EMERGENCY DEPT VISIT MOD MDM: CPT

## 2024-03-29 PROCEDURE — 71045 X-RAY EXAM CHEST 1 VIEW: CPT

## 2024-03-29 RX ORDER — DROPERIDOL 2.5 MG/ML
2.5 INJECTION, SOLUTION INTRAMUSCULAR; INTRAVENOUS ONCE
Status: COMPLETED | OUTPATIENT
Start: 2024-03-29 | End: 2024-03-29

## 2024-03-29 RX ADMIN — DROPERIDOL 2.5 MG: 2.5 INJECTION, SOLUTION INTRAMUSCULAR; INTRAVENOUS at 01:30

## 2024-03-29 NOTE — ED PROVIDER NOTES
Date seen: 3/29/2024      Brief HPI:  Pt is a 49 y.o. male with history of substance abuse, cirrhosis of liver who presents for acute intoxication.  It was reported by EMS alcohol and drug paraphernalia were around on scene.  History limited per patient.  Per EMS patient had reported words of chest pain en route.    - See Dr. Mart note for complete HPI, history, and physical examination.        LAB RESULTS  Recent Results (from the past 24 hour(s))   Comprehensive Metabolic Panel    Collection Time: 03/29/24  1:27 AM    Specimen: Blood   Result Value Ref Range    Glucose 115 (H) 65 - 99 mg/dL    BUN 14 6 - 20 mg/dL    Creatinine 0.87 0.76 - 1.27 mg/dL    Sodium 143 136 - 145 mmol/L    Potassium 3.9 3.5 - 5.2 mmol/L    Chloride 108 (H) 98 - 107 mmol/L    CO2 23.0 22.0 - 29.0 mmol/L    Calcium 9.3 8.6 - 10.5 mg/dL    Total Protein 7.3 6.0 - 8.5 g/dL    Albumin 4.4 3.5 - 5.2 g/dL    ALT (SGPT) 25 1 - 41 U/L    AST (SGOT) 31 1 - 40 U/L    Alkaline Phosphatase 111 39 - 117 U/L    Total Bilirubin 0.8 0.0 - 1.2 mg/dL    Globulin 2.9 gm/dL    A/G Ratio 1.5 g/dL    BUN/Creatinine Ratio 16.1 7.0 - 25.0    Anion Gap 12.0 5.0 - 15.0 mmol/L    eGFR 107.1 >60.0 mL/min/1.73   High Sensitivity Troponin T    Collection Time: 03/29/24  1:27 AM    Specimen: Blood   Result Value Ref Range    HS Troponin T 8 <22 ng/L   Ethanol    Collection Time: 03/29/24  1:27 AM    Specimen: Blood   Result Value Ref Range    Ethanol <10 0 - 10 mg/dL    Ethanol % <0.010 %   CBC Auto Differential    Collection Time: 03/29/24  1:27 AM    Specimen: Blood   Result Value Ref Range    WBC 4.45 3.40 - 10.80 10*3/mm3    RBC 5.08 4.14 - 5.80 10*6/mm3    Hemoglobin 14.7 13.0 - 17.7 g/dL    Hematocrit 43.0 37.5 - 51.0 %    MCV 84.6 79.0 - 97.0 fL    MCH 28.9 26.6 - 33.0 pg    MCHC 34.2 31.5 - 35.7 g/dL    RDW 14.9 12.3 - 15.4 %    RDW-SD 46.2 37.0 - 54.0 fl    MPV 10.6 6.0 - 12.0 fL    Platelets 69 (L) 140 - 450 10*3/mm3    Neutrophil % 77.0 (H) 42.7 - 76.0 %     Lymphocyte % 16.6 (L) 19.6 - 45.3 %    Monocyte % 4.9 (L) 5.0 - 12.0 %    Eosinophil % 0.4 0.3 - 6.2 %    Basophil % 0.4 0.0 - 1.5 %    Neutrophils, Absolute 3.42 1.70 - 7.00 10*3/mm3    Lymphocytes, Absolute 0.74 0.70 - 3.10 10*3/mm3    Monocytes, Absolute 0.22 0.10 - 0.90 10*3/mm3    Eosinophils, Absolute 0.02 0.00 - 0.40 10*3/mm3    Basophils, Absolute 0.02 0.00 - 0.20 10*3/mm3   ECG 12 Lead Chest Pain    Collection Time: 03/29/24  1:36 AM   Result Value Ref Range    QT Interval 436 ms    QTC Interval 471 ms   High Sensitivity Troponin T 2Hr    Collection Time: 03/29/24  4:09 AM    Specimen: Blood   Result Value Ref Range    HS Troponin T 8 <22 ng/L    Troponin T Delta 0 >=-4 - <+4 ng/L       Ordered the above labs and reviewed the results.              EKG: See MD note for interpretation   Time: 0136, heart rate 70 bpm, sinus rhythm, no ST elevation                Medications given in ED:   Medications   droperidol (INAPSINE) injection 2.5 mg (2.5 mg Intravenous Given 3/29/24 0130)             Procedures:   Procedures                     Progress Notes/ED Course:   ED Course as of 03/29/24 1442   Fri Mar 29, 2024   0115 XR Chest 1 View  My independent interpretation of the imaging study is no dense consolidation [TR]   0144 EKG          EKG time: 136  Rhythm/Rate: Normal sinus, rate 70  P waves and AZ: Normal P, normal AZ  QRS, axis: Narrow QRS, normal axis  ST and T waves: No acute    Independently Interpreted by me  Not significantly changed compared to prior 8/6/2022   [TR]   0144 XR Chest 1 View  My independent interpretation of the imaging study is no pneumothorax [TR]   0144 Platelets(!): 69  Chronic, unchanged [TR]   0354 Ethanol: <10 [SA]   0355 HS Troponin T: 8 [SA]   0355 WBC: 4.45 [SA]   0355 Hemoglobin: 14.7 [SA]   0355 BUN/Creatinine Ratio: 16.1 [SA]   0355 Sodium: 143 [SA]   0355 Potassium: 3.9 [SA]   0355 CO2: 23.0    Recieved CT head result,   Impression: No acute intracranial hemorrhage,  midline shift, or mass effect. This resulted during EPIC downtime.    Reviewed CXR, my independent interpretation - no pneumothorax. Final radiological read not available at time of disposition due to EPIC downtime.  [SA]   0507 HS Troponin T: 8 [SA]   0539 Pt re-checked.  Patient admits to using 2-3 Suboxone prior to arrival.  States he used as he was craving opioids.  States he took too much.  He denies alcohol use or other drug use.  Patient has no current complaints.  He is awake, alert, cooperative.  Answers questions appropriately.  Alert and orient x 3.  Tolerating p.o. intake.  Gait steady.  Clinically sober.  States he feels safe going home.  Denies SI.  ED workup overall reassuring.  Received preliminary result of CT head during downtime, no acute abnormalities.  Stable for discharge and outpatient follow-up.  Discussed plan of discharge.     Patient discharged in stable condition.    Reviewed implications of results, diagnosis, meds, responsibility to follow up, warning signs and symptoms of possible worsening, potential complications and reasons to return to ER.    Patient/Family voiced understanding of above instructions.    Discussed plan for discharge, as there is no emergent indication for admission. Patient referred to primary care provider for BP management due to today's BP. Pt/family is agreeable and understands need for follow up and repeat testing.  Pt is aware that discharge does not mean that nothing is wrong but it indicates no emergency is present that requires admission and they must continue care with follow-up as given below or physician of their choice.    [SA]      ED Course User Index  [SA] Mary Rabago PA-C  [TR] Humberto Mart MD                     DIAGNOSIS  Final diagnoses:   Substance abuse   Thrombocytopenia             Follow Up:  Juan Leigh, APRN  [address] [address]  [phone]    Schedule an appointment as soon as possible for a visit  in 2 days                  Verbal Discharge Instructions Given:  Take your medications as prescribed.  Avoid illicit drugs, take Suboxone as prescribed.  Return to the ED for chest pain, shortness of breath, passing out, confusion, uncontrollable vomiting, new or worsening symptoms.            Disposition:   ED Disposition       ED Disposition   Discharge    Condition   Stable    Comment   --                     Latest Documented Vital Signs:  As of 14:42 EDT  BP- 131/76 HR- 72 Temp- 98.1 °F (36.7 °C) (Tympanic) O2 sat- 94%      --        Provider Attestation:   I personally reviewed the past medical history, past surgical history, social history, family history, current medications, and allergies as they appear in the chart.    The patient was seen and examined by myself and Dr. Mart who agrees with plan.      Please note that portions of this were completed with a voice recognition program.     Note Disclaimer: At [facility], we believe that sharing information builds trust and better relationships. You are receiving this note because you are receiving care at [facility] or recently visited. It is possible you will see health information before a provider has talked with you about it. This kind of information can be easy to misunderstand. To help you fully understand what it means for your health, we urge you to discuss this note with your provider.        Provider note signed by:       Mary Rabago PA-C  03/29/24 3287

## 2024-03-29 NOTE — ED TRIAGE NOTES
Pt to ED via EMS with complaints of chest pain. EMS reports drug use but pt would not state what. ETOH on board. Pt uncooperative with EMS. Pt will not open his eyes in triage.
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
## 2024-03-29 NOTE — DISCHARGE INSTRUCTIONS
Take your medications as prescribed.  Avoid illicit drugs, take Suboxone as prescribed.  Return to the ED for chest pain, shortness of breath, passing out, confusion, uncontrollable vomiting, new or worsening symptoms.

## (undated) DEVICE — KT ORCA ORCAPOD DISP STRL

## (undated) DEVICE — BITEBLOCK OMNI BLOC

## (undated) DEVICE — TUBING, SUCTION, 1/4" X 10', STRAIGHT: Brand: MEDLINE

## (undated) DEVICE — ADAPT CLN BIOGUARD AIR/H2O DISP

## (undated) DEVICE — CANN O2 ETCO2 FITS ALL CONN CO2 SMPL A/ 7IN DISP LF

## (undated) DEVICE — LN SMPL CO2 SHTRM SD STREAM W/M LUER

## (undated) DEVICE — SENSR O2 OXIMAX FNGR A/ 18IN NONSTR